# Patient Record
Sex: FEMALE | Race: WHITE | NOT HISPANIC OR LATINO | Employment: OTHER | URBAN - METROPOLITAN AREA
[De-identification: names, ages, dates, MRNs, and addresses within clinical notes are randomized per-mention and may not be internally consistent; named-entity substitution may affect disease eponyms.]

---

## 2024-03-10 ENCOUNTER — APPOINTMENT (OUTPATIENT)
Dept: RADIOLOGY | Facility: HOSPITAL | Age: 59
End: 2024-03-10
Payer: MEDICARE

## 2024-03-10 ENCOUNTER — HOSPITAL ENCOUNTER (OUTPATIENT)
Facility: HOSPITAL | Age: 59
Setting detail: OBSERVATION
Discharge: HOME | End: 2024-03-12
Attending: STUDENT IN AN ORGANIZED HEALTH CARE EDUCATION/TRAINING PROGRAM | Admitting: STUDENT IN AN ORGANIZED HEALTH CARE EDUCATION/TRAINING PROGRAM
Payer: MEDICARE

## 2024-03-10 DIAGNOSIS — S82.851A CLOSED TRIMALLEOLAR FRACTURE OF RIGHT ANKLE, INITIAL ENCOUNTER: ICD-10-CM

## 2024-03-10 DIAGNOSIS — S82.891A CLOSED FRACTURE OF RIGHT ANKLE, INITIAL ENCOUNTER: Primary | ICD-10-CM

## 2024-03-10 DIAGNOSIS — M25.571 ACUTE RIGHT ANKLE PAIN: ICD-10-CM

## 2024-03-10 DIAGNOSIS — R11.0 NAUSEA: ICD-10-CM
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
## 2024-03-10 LAB
ALBUMIN SERPL BCP-MCNC: 4.1 G/DL (ref 3.4–5)
ALP SERPL-CCNC: 77 U/L (ref 33–110)
ALT SERPL W P-5'-P-CCNC: 14 U/L (ref 7–45)
ANION GAP SERPL CALC-SCNC: 12 MMOL/L (ref 10–20)
APTT PPP: 32 SECONDS (ref 27–38)
AST SERPL W P-5'-P-CCNC: 17 U/L (ref 9–39)
BASOPHILS # BLD AUTO: 0.04 X10*3/UL (ref 0–0.1)
BASOPHILS NFR BLD AUTO: 0.4 %
BILIRUB SERPL-MCNC: 0.3 MG/DL (ref 0–1.2)
BUN SERPL-MCNC: 15 MG/DL (ref 6–23)
CALCIUM SERPL-MCNC: 8.9 MG/DL (ref 8.6–10.3)
CHLORIDE SERPL-SCNC: 105 MMOL/L (ref 98–107)
CO2 SERPL-SCNC: 23 MMOL/L (ref 21–32)
CREAT SERPL-MCNC: 0.93 MG/DL (ref 0.5–1.05)
EGFRCR SERPLBLD CKD-EPI 2021: 72 ML/MIN/1.73M*2
EOSINOPHIL # BLD AUTO: 0.05 X10*3/UL (ref 0–0.7)
EOSINOPHIL NFR BLD AUTO: 0.5 %
ERYTHROCYTE [DISTWIDTH] IN BLOOD BY AUTOMATED COUNT: 13 % (ref 11.5–14.5)
GLUCOSE SERPL-MCNC: 108 MG/DL (ref 74–99)
HCT VFR BLD AUTO: 39.2 % (ref 36–46)
HGB BLD-MCNC: 13 G/DL (ref 12–16)
IMM GRANULOCYTES # BLD AUTO: 0.06 X10*3/UL (ref 0–0.7)
IMM GRANULOCYTES NFR BLD AUTO: 0.6 % (ref 0–0.9)
INR PPP: 1 (ref 0.9–1.1)
LYMPHOCYTES # BLD AUTO: 1.21 X10*3/UL (ref 1.2–4.8)
LYMPHOCYTES NFR BLD AUTO: 11.2 %
MCH RBC QN AUTO: 28.4 PG (ref 26–34)
MCHC RBC AUTO-ENTMCNC: 33.2 G/DL (ref 32–36)
MCV RBC AUTO: 86 FL (ref 80–100)
MONOCYTES # BLD AUTO: 0.57 X10*3/UL (ref 0.1–1)
MONOCYTES NFR BLD AUTO: 5.3 %
NEUTROPHILS # BLD AUTO: 8.92 X10*3/UL (ref 1.2–7.7)
NEUTROPHILS NFR BLD AUTO: 82 %
NRBC BLD-RTO: 0 /100 WBCS (ref 0–0)
PLATELET # BLD AUTO: 276 X10*3/UL (ref 150–450)
POTASSIUM SERPL-SCNC: 4.4 MMOL/L (ref 3.5–5.3)
PROT SERPL-MCNC: 6.7 G/DL (ref 6.4–8.2)
PROTHROMBIN TIME: 11.2 SECONDS (ref 9.8–12.8)
RBC # BLD AUTO: 4.58 X10*6/UL (ref 4–5.2)
SODIUM SERPL-SCNC: 136 MMOL/L (ref 136–145)
WBC # BLD AUTO: 10.9 X10*3/UL (ref 4.4–11.3)

## 2024-03-10 PROCEDURE — 2500000005 HC RX 250 GENERAL PHARMACY W/O HCPCS: Performed by: STUDENT IN AN ORGANIZED HEALTH CARE EDUCATION/TRAINING PROGRAM

## 2024-03-10 PROCEDURE — 2500000004 HC RX 250 GENERAL PHARMACY W/ HCPCS (ALT 636 FOR OP/ED): Performed by: STUDENT IN AN ORGANIZED HEALTH CARE EDUCATION/TRAINING PROGRAM

## 2024-03-10 PROCEDURE — 96376 TX/PRO/DX INJ SAME DRUG ADON: CPT

## 2024-03-10 PROCEDURE — 73630 X-RAY EXAM OF FOOT: CPT | Mod: RT

## 2024-03-10 PROCEDURE — 99285 EMERGENCY DEPT VISIT HI MDM: CPT | Mod: 25

## 2024-03-10 PROCEDURE — 29515 APPLICATION SHORT LEG SPLINT: CPT | Performed by: STUDENT IN AN ORGANIZED HEALTH CARE EDUCATION/TRAINING PROGRAM

## 2024-03-10 PROCEDURE — G0378 HOSPITAL OBSERVATION PER HR: HCPCS

## 2024-03-10 PROCEDURE — 96375 TX/PRO/DX INJ NEW DRUG ADDON: CPT

## 2024-03-10 PROCEDURE — 36415 COLL VENOUS BLD VENIPUNCTURE: CPT | Performed by: STUDENT IN AN ORGANIZED HEALTH CARE EDUCATION/TRAINING PROGRAM

## 2024-03-10 PROCEDURE — 80053 COMPREHEN METABOLIC PANEL: CPT | Performed by: STUDENT IN AN ORGANIZED HEALTH CARE EDUCATION/TRAINING PROGRAM

## 2024-03-10 PROCEDURE — 94681 O2 UPTK CO2 OUTP % O2 XTRC: CPT

## 2024-03-10 PROCEDURE — 85610 PROTHROMBIN TIME: CPT | Performed by: STUDENT IN AN ORGANIZED HEALTH CARE EDUCATION/TRAINING PROGRAM

## 2024-03-10 PROCEDURE — 85025 COMPLETE CBC W/AUTO DIFF WBC: CPT | Performed by: STUDENT IN AN ORGANIZED HEALTH CARE EDUCATION/TRAINING PROGRAM

## 2024-03-10 PROCEDURE — 73630 X-RAY EXAM OF FOOT: CPT | Mod: RIGHT SIDE | Performed by: RADIOLOGY

## 2024-03-10 PROCEDURE — 73610 X-RAY EXAM OF ANKLE: CPT | Mod: RT

## 2024-03-10 PROCEDURE — 73610 X-RAY EXAM OF ANKLE: CPT | Mod: RIGHT SIDE | Performed by: RADIOLOGY

## 2024-03-10 PROCEDURE — 2500000001 HC RX 250 WO HCPCS SELF ADMINISTERED DRUGS (ALT 637 FOR MEDICARE OP): Performed by: STUDENT IN AN ORGANIZED HEALTH CARE EDUCATION/TRAINING PROGRAM

## 2024-03-10 PROCEDURE — 73610 X-RAY EXAM OF ANKLE: CPT | Mod: 59,RT

## 2024-03-10 PROCEDURE — 96361 HYDRATE IV INFUSION ADD-ON: CPT

## 2024-03-10 PROCEDURE — 99222 1ST HOSP IP/OBS MODERATE 55: CPT | Performed by: STUDENT IN AN ORGANIZED HEALTH CARE EDUCATION/TRAINING PROGRAM

## 2024-03-10 RX ORDER — KETOROLAC TROMETHAMINE 30 MG/ML
15 INJECTION, SOLUTION INTRAMUSCULAR; INTRAVENOUS ONCE
Status: COMPLETED | OUTPATIENT
Start: 2024-03-10 | End: 2024-03-10

## 2024-03-10 RX ORDER — GUAIFENESIN 600 MG/1
600 TABLET, EXTENDED RELEASE ORAL EVERY 12 HOURS PRN
Status: DISCONTINUED | OUTPATIENT
Start: 2024-03-10 | End: 2024-03-12 | Stop reason: HOSPADM

## 2024-03-10 RX ORDER — ALPRAZOLAM 0.5 MG/1
0.5 TABLET ORAL 3 TIMES DAILY PRN
Status: DISCONTINUED | OUTPATIENT
Start: 2024-03-10 | End: 2024-03-12 | Stop reason: HOSPADM

## 2024-03-10 RX ORDER — SENNOSIDES 8.6 MG/1
2 TABLET ORAL 2 TIMES DAILY
Status: DISCONTINUED | OUTPATIENT
Start: 2024-03-10 | End: 2024-03-12 | Stop reason: HOSPADM

## 2024-03-10 RX ORDER — MORPHINE SULFATE 4 MG/ML
4 INJECTION, SOLUTION INTRAMUSCULAR; INTRAVENOUS EVERY 4 HOURS PRN
Status: DISCONTINUED | OUTPATIENT
Start: 2024-03-10 | End: 2024-03-12 | Stop reason: HOSPADM

## 2024-03-10 RX ORDER — PROPOFOL 10 MG/ML
INJECTION, EMULSION INTRAVENOUS CODE/TRAUMA/SEDATION MEDICATION
Status: COMPLETED | OUTPATIENT
Start: 2024-03-10 | End: 2024-03-10

## 2024-03-10 RX ORDER — ALBUTEROL SULFATE 90 UG/1
2 AEROSOL, METERED RESPIRATORY (INHALATION) EVERY 4 HOURS PRN
Status: DISCONTINUED | OUTPATIENT
Start: 2024-03-10 | End: 2024-03-12 | Stop reason: HOSPADM

## 2024-03-10 RX ORDER — PROPOFOL 10 MG/ML
0.5 INJECTION, EMULSION INTRAVENOUS AS NEEDED
Status: DISCONTINUED | OUTPATIENT
Start: 2024-03-10 | End: 2024-03-12 | Stop reason: HOSPADM

## 2024-03-10 RX ORDER — ACETAMINOPHEN 650 MG/1
650 SUPPOSITORY RECTAL EVERY 4 HOURS PRN
Status: DISCONTINUED | OUTPATIENT
Start: 2024-03-10 | End: 2024-03-12 | Stop reason: HOSPADM

## 2024-03-10 RX ORDER — PROPOFOL 10 MG/ML
100 INJECTION, EMULSION INTRAVENOUS ONCE
Status: DISCONTINUED | OUTPATIENT
Start: 2024-03-10 | End: 2024-03-12 | Stop reason: HOSPADM

## 2024-03-10 RX ORDER — ONDANSETRON HYDROCHLORIDE 2 MG/ML
4 INJECTION, SOLUTION INTRAVENOUS EVERY 8 HOURS PRN
Status: DISCONTINUED | OUTPATIENT
Start: 2024-03-10 | End: 2024-03-12 | Stop reason: HOSPADM

## 2024-03-10 RX ORDER — ACETAMINOPHEN 160 MG/5ML
650 SOLUTION ORAL EVERY 4 HOURS PRN
Status: DISCONTINUED | OUTPATIENT
Start: 2024-03-10 | End: 2024-03-12 | Stop reason: HOSPADM

## 2024-03-10 RX ORDER — FLUTICASONE FUROATE AND VILANTEROL 200; 25 UG/1; UG/1
1 POWDER RESPIRATORY (INHALATION)
Status: DISCONTINUED | OUTPATIENT
Start: 2024-03-10 | End: 2024-03-12 | Stop reason: HOSPADM

## 2024-03-10 RX ORDER — ALPRAZOLAM 0.5 MG/1
0.5 TABLET ORAL 3 TIMES DAILY PRN
COMMUNITY

## 2024-03-10 RX ORDER — FENTANYL CITRATE 50 UG/ML
50 INJECTION, SOLUTION INTRAMUSCULAR; INTRAVENOUS ONCE
Status: COMPLETED | OUTPATIENT
Start: 2024-03-10 | End: 2024-03-10

## 2024-03-10 RX ORDER — HEPARIN SODIUM 5000 [USP'U]/ML
7500 INJECTION, SOLUTION INTRAVENOUS; SUBCUTANEOUS EVERY 8 HOURS SCHEDULED
Status: DISCONTINUED | OUTPATIENT
Start: 2024-03-10 | End: 2024-03-12

## 2024-03-10 RX ORDER — ONDANSETRON HYDROCHLORIDE 2 MG/ML
4 INJECTION, SOLUTION INTRAVENOUS ONCE
Status: COMPLETED | OUTPATIENT
Start: 2024-03-10 | End: 2024-03-10

## 2024-03-10 RX ORDER — ATORVASTATIN CALCIUM 80 MG/1
80 TABLET, FILM COATED ORAL DAILY
COMMUNITY

## 2024-03-10 RX ORDER — METOPROLOL SUCCINATE 25 MG/1
25 TABLET, EXTENDED RELEASE ORAL DAILY
COMMUNITY

## 2024-03-10 RX ORDER — ACETAMINOPHEN 325 MG/1
650 TABLET ORAL EVERY 4 HOURS PRN
Status: DISCONTINUED | OUTPATIENT
Start: 2024-03-10 | End: 2024-03-12 | Stop reason: HOSPADM

## 2024-03-10 RX ORDER — SUMATRIPTAN 50 MG/1
50 TABLET, FILM COATED ORAL ONCE AS NEEDED
COMMUNITY

## 2024-03-10 RX ORDER — ONDANSETRON 4 MG/1
4 TABLET, ORALLY DISINTEGRATING ORAL EVERY 8 HOURS PRN
Status: DISCONTINUED | OUTPATIENT
Start: 2024-03-10 | End: 2024-03-12 | Stop reason: HOSPADM

## 2024-03-10 RX ORDER — OMEPRAZOLE 40 MG/1
40 CAPSULE, DELAYED RELEASE ORAL 2 TIMES DAILY
COMMUNITY

## 2024-03-10 RX ORDER — GUAIFENESIN/DEXTROMETHORPHAN 100-10MG/5
5 SYRUP ORAL EVERY 4 HOURS PRN
Status: DISCONTINUED | OUTPATIENT
Start: 2024-03-10 | End: 2024-03-12 | Stop reason: HOSPADM

## 2024-03-10 RX ORDER — ATORVASTATIN CALCIUM 80 MG/1
80 TABLET, FILM COATED ORAL DAILY
Status: DISCONTINUED | OUTPATIENT
Start: 2024-03-10 | End: 2024-03-12 | Stop reason: HOSPADM

## 2024-03-10 RX ORDER — KETOROLAC TROMETHAMINE 30 MG/ML
30 INJECTION, SOLUTION INTRAMUSCULAR; INTRAVENOUS EVERY 6 HOURS PRN
Status: DISCONTINUED | OUTPATIENT
Start: 2024-03-10 | End: 2024-03-12 | Stop reason: HOSPADM

## 2024-03-10 RX ORDER — METOPROLOL SUCCINATE 25 MG/1
25 TABLET, EXTENDED RELEASE ORAL DAILY
Status: DISCONTINUED | OUTPATIENT
Start: 2024-03-10 | End: 2024-03-12 | Stop reason: HOSPADM

## 2024-03-10 RX ORDER — HEPARIN SODIUM 5000 [USP'U]/ML
7500 INJECTION, SOLUTION INTRAVENOUS; SUBCUTANEOUS EVERY 8 HOURS SCHEDULED
Status: DISCONTINUED | OUTPATIENT
Start: 2024-03-10 | End: 2024-03-10

## 2024-03-10 RX ORDER — BUDESONIDE AND FORMOTEROL FUMARATE DIHYDRATE 160; 4.5 UG/1; UG/1
2 AEROSOL RESPIRATORY (INHALATION)
COMMUNITY

## 2024-03-10 RX ORDER — ALBUTEROL SULFATE 90 UG/1
2 AEROSOL, METERED RESPIRATORY (INHALATION) EVERY 4 HOURS PRN
COMMUNITY

## 2024-03-10 RX ORDER — SUMATRIPTAN SUCCINATE 25 MG/1
50 TABLET ORAL ONCE AS NEEDED
Status: DISCONTINUED | OUTPATIENT
Start: 2024-03-10 | End: 2024-03-12 | Stop reason: HOSPADM

## 2024-03-10 RX ADMIN — HEPARIN SODIUM 7500 UNITS: 5000 INJECTION INTRAVENOUS; SUBCUTANEOUS at 21:31

## 2024-03-10 RX ADMIN — SODIUM CHLORIDE 1000 ML: 9 INJECTION, SOLUTION INTRAVENOUS at 14:38

## 2024-03-10 RX ADMIN — PROPOFOL 50 MG: 10 INJECTION, EMULSION INTRAVENOUS at 14:43

## 2024-03-10 RX ADMIN — STANDARDIZED SENNA CONCENTRATE 17.2 MG: 8.6 TABLET ORAL at 21:32

## 2024-03-10 RX ADMIN — ONDANSETRON 4 MG: 2 INJECTION INTRAMUSCULAR; INTRAVENOUS at 19:52

## 2024-03-10 RX ADMIN — MORPHINE SULFATE 4 MG: 4 INJECTION, SOLUTION INTRAMUSCULAR; INTRAVENOUS at 23:05

## 2024-03-10 RX ADMIN — Medication 2 L/MIN: at 14:40

## 2024-03-10 RX ADMIN — ONDANSETRON 4 MG: 2 INJECTION INTRAMUSCULAR; INTRAVENOUS at 13:31

## 2024-03-10 RX ADMIN — Medication: at 15:31

## 2024-03-10 RX ADMIN — PROPOFOL 30 MG: 10 INJECTION, EMULSION INTRAVENOUS at 14:48

## 2024-03-10 RX ADMIN — MORPHINE SULFATE 4 MG: 4 INJECTION, SOLUTION INTRAMUSCULAR; INTRAVENOUS at 18:42

## 2024-03-10 RX ADMIN — FENTANYL CITRATE 50 MCG: 50 INJECTION, SOLUTION INTRAMUSCULAR; INTRAVENOUS at 13:31

## 2024-03-10 RX ADMIN — KETOROLAC TROMETHAMINE 30 MG: 30 INJECTION, SOLUTION INTRAMUSCULAR at 21:33

## 2024-03-10 RX ADMIN — PROPOFOL 20 MG: 10 INJECTION, EMULSION INTRAVENOUS at 14:46

## 2024-03-10 RX ADMIN — KETOROLAC TROMETHAMINE 15 MG: 30 INJECTION, SOLUTION INTRAMUSCULAR at 15:29

## 2024-03-10 RX ADMIN — ALPRAZOLAM 0.5 MG: 0.5 TABLET ORAL at 23:06

## 2024-03-10 SDOH — SOCIAL STABILITY: SOCIAL INSECURITY: HAVE YOU HAD THOUGHTS OF HARMING ANYONE ELSE?: NO

## 2024-03-10 SDOH — SOCIAL STABILITY: SOCIAL INSECURITY: HAS ANYONE EVER THREATENED TO HURT YOUR FAMILY OR YOUR PETS?: NO

## 2024-03-10 SDOH — SOCIAL STABILITY: SOCIAL INSECURITY: WERE YOU ABLE TO COMPLETE ALL THE BEHAVIORAL HEALTH SCREENINGS?: YES

## 2024-03-10 SDOH — SOCIAL STABILITY: SOCIAL INSECURITY: ARE YOU OR HAVE YOU BEEN THREATENED OR ABUSED PHYSICALLY, EMOTIONALLY, OR SEXUALLY BY ANYONE?: NO

## 2024-03-10 SDOH — SOCIAL STABILITY: SOCIAL INSECURITY: DO YOU FEEL ANYONE HAS EXPLOITED OR TAKEN ADVANTAGE OF YOU FINANCIALLY OR OF YOUR PERSONAL PROPERTY?: NO

## 2024-03-10 SDOH — SOCIAL STABILITY: SOCIAL INSECURITY: DO YOU FEEL UNSAFE GOING BACK TO THE PLACE WHERE YOU ARE LIVING?: NO

## 2024-03-10 SDOH — SOCIAL STABILITY: SOCIAL INSECURITY: ABUSE: ADULT

## 2024-03-10 SDOH — SOCIAL STABILITY: SOCIAL INSECURITY: DOES ANYONE TRY TO KEEP YOU FROM HAVING/CONTACTING OTHER FRIENDS OR DOING THINGS OUTSIDE YOUR HOME?: NO

## 2024-03-10 SDOH — SOCIAL STABILITY: SOCIAL INSECURITY: ARE THERE ANY APPARENT SIGNS OF INJURIES/BEHAVIORS THAT COULD BE RELATED TO ABUSE/NEGLECT?: NO

## 2024-03-10 ASSESSMENT — LIFESTYLE VARIABLES
HAVE PEOPLE ANNOYED YOU BY CRITICIZING YOUR DRINKING: NO
HOW OFTEN DO YOU HAVE A DRINK CONTAINING ALCOHOL: NEVER
EVER HAD A DRINK FIRST THING IN THE MORNING TO STEADY YOUR NERVES TO GET RID OF A HANGOVER: NO
AUDIT-C TOTAL SCORE: 0
HOW MANY STANDARD DRINKS CONTAINING ALCOHOL DO YOU HAVE ON A TYPICAL DAY: PATIENT DOES NOT DRINK
HAVE YOU EVER FELT YOU SHOULD CUT DOWN ON YOUR DRINKING: NO
HOW OFTEN DO YOU HAVE 6 OR MORE DRINKS ON ONE OCCASION: NEVER
AUDIT-C TOTAL SCORE: 0
EVER FELT BAD OR GUILTY ABOUT YOUR DRINKING: NO
SKIP TO QUESTIONS 9-10: 1

## 2024-03-10 ASSESSMENT — ACTIVITIES OF DAILY LIVING (ADL)
ADEQUATE_TO_COMPLETE_ADL: YES
WALKS IN HOME: INDEPENDENT
TOILETING: INDEPENDENT
JUDGMENT_ADEQUATE_SAFELY_COMPLETE_DAILY_ACTIVITIES: YES
BATHING: INDEPENDENT
LACK_OF_TRANSPORTATION: NO
DRESSING YOURSELF: INDEPENDENT
PATIENT'S MEMORY ADEQUATE TO SAFELY COMPLETE DAILY ACTIVITIES?: YES
HEARING - LEFT EAR: FUNCTIONAL
FEEDING YOURSELF: INDEPENDENT
ASSISTIVE_DEVICE: CANE;EYEGLASSES;DENTURES UPPER
HEARING - RIGHT EAR: FUNCTIONAL
GROOMING: INDEPENDENT

## 2024-03-10 ASSESSMENT — COGNITIVE AND FUNCTIONAL STATUS - GENERAL
STANDING UP FROM CHAIR USING ARMS: A LOT
TURNING FROM BACK TO SIDE WHILE IN FLAT BAD: A LITTLE
STANDING UP FROM CHAIR USING ARMS: A LOT
CLIMB 3 TO 5 STEPS WITH RAILING: A LOT
MOVING FROM LYING ON BACK TO SITTING ON SIDE OF FLAT BED WITH BEDRAILS: A LITTLE
WALKING IN HOSPITAL ROOM: A LOT
HELP NEEDED FOR BATHING: A LITTLE
DRESSING REGULAR LOWER BODY CLOTHING: A LITTLE
CLIMB 3 TO 5 STEPS WITH RAILING: A LOT
HELP NEEDED FOR BATHING: A LITTLE
DRESSING REGULAR LOWER BODY CLOTHING: A LITTLE
DAILY ACTIVITIY SCORE: 21
MOVING TO AND FROM BED TO CHAIR: A LOT
WALKING IN HOSPITAL ROOM: A LOT
TURNING FROM BACK TO SIDE WHILE IN FLAT BAD: A LITTLE
MOVING TO AND FROM BED TO CHAIR: A LOT
PATIENT BASELINE BEDBOUND: NO
MOBILITY SCORE: 14
TOILETING: A LITTLE
MOVING FROM LYING ON BACK TO SITTING ON SIDE OF FLAT BED WITH BEDRAILS: A LITTLE
MOBILITY SCORE: 14

## 2024-03-10 ASSESSMENT — PAIN - FUNCTIONAL ASSESSMENT
PAIN_FUNCTIONAL_ASSESSMENT: 0-10

## 2024-03-10 ASSESSMENT — PAIN SCALES - GENERAL
PAINLEVEL_OUTOF10: 5 - MODERATE PAIN
PAINLEVEL_OUTOF10: 7
PAINLEVEL_OUTOF10: 4
PAINLEVEL_OUTOF10: 6
PAINLEVEL_OUTOF10: 7

## 2024-03-10 ASSESSMENT — PATIENT HEALTH QUESTIONNAIRE - PHQ9
2. FEELING DOWN, DEPRESSED OR HOPELESS: NOT AT ALL
1. LITTLE INTEREST OR PLEASURE IN DOING THINGS: NOT AT ALL
SUM OF ALL RESPONSES TO PHQ9 QUESTIONS 1 & 2: 0

## 2024-03-10 ASSESSMENT — PAIN DESCRIPTION - LOCATION
LOCATION: LEG
LOCATION: ANKLE

## 2024-03-10 ASSESSMENT — PAIN DESCRIPTION - ORIENTATION
ORIENTATION: RIGHT
ORIENTATION: RIGHT

## 2024-03-10 ASSESSMENT — COLUMBIA-SUICIDE SEVERITY RATING SCALE - C-SSRS
2. HAVE YOU ACTUALLY HAD ANY THOUGHTS OF KILLING YOURSELF?: NO
1. IN THE PAST MONTH, HAVE YOU WISHED YOU WERE DEAD OR WISHED YOU COULD GO TO SLEEP AND NOT WAKE UP?: NO
6. HAVE YOU EVER DONE ANYTHING, STARTED TO DO ANYTHING, OR PREPARED TO DO ANYTHING TO END YOUR LIFE?: NO

## 2024-03-10 NOTE — ED PROVIDER NOTES
HPI   Chief Complaint   Patient presents with   • Fall       Patient a 58-year-old female presents to the emergency department complaints of right ankle pain.  Patient states she was walking her dog when the dog pulled her and she ended up slipping and falling.  Patient was able to get herself into the house after the initial incident.  She denies hitting her head or losing consciousness.  Patient denies any blood thinners.  Patient Endorses pain only to her right ankle denies any other pain or complaints.      History provided by:  Patient   used: No                        No data recorded                   Patient History   No past medical history on file.  No past surgical history on file.  No family history on file.  Social History     Tobacco Use   • Smoking status: Not on file   • Smokeless tobacco: Not on file   Substance Use Topics   • Alcohol use: Not on file   • Drug use: Not on file       Physical Exam   ED Triage Vitals   Temp Pulse Resp BP   -- -- -- --      SpO2 Temp src Heart Rate Source Patient Position   -- -- -- --      BP Location FiO2 (%)     -- --       Physical Exam  Vitals and nursing note reviewed.   Constitutional:       General: She is not in acute distress.     Appearance: Normal appearance. She is not ill-appearing, toxic-appearing or diaphoretic.   HENT:      Head: Normocephalic and atraumatic.      Nose: Nose normal.      Mouth/Throat:      Mouth: Mucous membranes are moist.      Pharynx: No oropharyngeal exudate or posterior oropharyngeal erythema.   Eyes:      General: No scleral icterus.     Extraocular Movements: Extraocular movements intact.      Pupils: Pupils are equal, round, and reactive to light.   Cardiovascular:      Rate and Rhythm: Normal rate and regular rhythm.      Pulses: Normal pulses.      Heart sounds: Normal heart sounds. No murmur heard.     No friction rub. No gallop.   Pulmonary:      Effort: Pulmonary effort is normal. No respiratory  distress.      Breath sounds: Normal breath sounds. No stridor. No wheezing, rhonchi or rales.   Chest:      Chest wall: No tenderness.   Abdominal:      General: Abdomen is flat. There is no distension.      Palpations: Abdomen is soft. There is no mass.      Tenderness: There is no abdominal tenderness. There is no guarding.      Hernia: No hernia is present.   Musculoskeletal:         General: Swelling, tenderness and signs of injury present.      Cervical back: Normal range of motion and neck supple. No rigidity.   Skin:     General: Skin is warm and dry.      Capillary Refill: Capillary refill takes less than 2 seconds.      Coloration: Skin is not jaundiced or pale.      Findings: No bruising, erythema, lesion or rash.   Neurological:      General: No focal deficit present.      Mental Status: She is alert and oriented to person, place, and time. Mental status is at baseline.   Psychiatric:         Mood and Affect: Mood normal.         Behavior: Behavior normal.         ED Course & MDM   Diagnoses as of 03/10/24 1546   Fall, initial encounter   Acute right ankle pain   Closed fracture of right ankle, initial encounter   Closed trimalleolar fracture of right ankle, initial encounter       Medical Decision Making  Patient a 58-year-old female presents emergency department complaints of right ankle pain.  There is notable swelling and tenderness to palpation throughout the right ankle.  Patient does have normal TP and DP pulses.  Sensation normal.  Patient does not have any pain or tenderness over the base of the fifth metatarsal or the head of the fibula.  Patient was given 100 mics of fentanyl via EMS as well as 4 mg of Zofran and placed in a vacuum splint.  X-rays were ordered.    X-ray showed trimalar fracture.  Spoke with on-call orthopedic surgeon Dr. Salmeron who recommended reduction and splinting for comfort and if the patient is able to tolerate the pain discharged home to follow-up in the office but  if the patient is unable to tolerate the pain or care for self at home would recommend admission to medicine with orthopedics on consult.  Patient was sedated with reduction and improved alignment on post x-rays.  MSPs remained intact.  Patient had a short leg posterior ankle stirrup splint applied by me with MSPs present before and after application.  Patient tolerated the procedure well.  Discussion with the patient about going home she states that she lives at home alone and does not have anyone that can help care for her and she would not be able to care for self at home and thus admission was recommended.  Call placed to hospitalist service. Dr. Sr accepted patient for admission.    Amount and/or Complexity of Data Reviewed  Labs: ordered.  Radiology: ordered. Decision-making details documented in ED Course.      Labs Reviewed   CBC WITH AUTO DIFFERENTIAL   COMPREHENSIVE METABOLIC PANEL   COAGULATION SCREEN     XR ankle right 3+ views   Final Result   TRIMALLEOLAR FRACTURE SUBLUXATION OF THE ANKLE AS ABOVE.   ___    Signed by Sharath Mccoy MD      XR foot right 3+ views   Final Result   TRIMALLEOLAR FRACTURE SUBLUXATION OF THE ANKLE AS ABOVE.   ___    Signed by Sharath Mccoy MD      XR ankle right 3+ views    (Results Pending)         Procedure  Moderate Sedation    Performed by: Wellington Caceres DO  Authorized by: Wellington Caceres DO    Consent:     Consent obtained:  Written    Consent given by:  Patient    Risks, benefits, and alternatives were discussed: yes      Risks discussed:  Allergic reaction, inadequate sedation, respiratory compromise necessitating ventilatory assistance and intubation, nausea and vomiting  Universal protocol:     Patient identity confirmed:  Verbally with patient and arm band  Indications:     Procedure performed:  Fracture reduction    Procedure necessitating sedation performed by:  Physician performing sedation    Intended level of sedation:  Moderate  Pre-sedation  assessment:     Time since last food or drink:  3.5 hrs    NPO status caution: urgency dictates proceeding with non-ideal NPO status      ASA classification: class 2 - patient with mild systemic disease      Pre-sedation assessments completed and reviewed: airway patency, anesthesia/sedation history, cardiovascular function, hydration status, mental status, nausea/vomiting, pain level, respiratory function and temperature      History of difficult intubation: no    Immediate pre-procedure details:     Reassessment: Patient reassessed immediately prior to procedure      Reviewed: vital signs      Verified: bag valve mask available, emergency equipment available, intubation equipment available, IV patency confirmed, oxygen available and suction available    Procedure details (see MAR for exact dosages):     Sedation start time:  3/10/2024 2:43 PM    Preoxygenation:  Nasal cannula    Sedation:  Propofol    Analgesia:  Fentanyl    Intra-procedure monitoring:  Blood pressure monitoring, continuous capnometry, frequent LOC assessments, frequent vital sign checks, continuous pulse oximetry and cardiac monitor    Intra-procedure events: none      Sedation end time:  3/10/2024 2:52 PM    Total sedation time (minutes):  9  Post-procedure details:     Procedure completion:  Tolerated  Orthopaedic Injury Treatment - Lower Extremity    Performed by: Wellington Caceres DO  Authorized by: Wellington Caceres DO    Consent:     Consent obtained:  Verbal    Consent given by:  Patient    Risks, benefits, and alternatives were discussed: yes      Risks discussed:  Fracture, nerve damage, restricted joint movement, vascular damage and irreducible dislocation    Alternatives discussed:  No treatment and delayed treatment  Universal protocol:     Patient identity confirmed:  Verbally with patient and arm band  Location:     Location:  Ankle    Ankle injury location:  R ankle    Ankle dislocation type: lateral    Pre-procedure details:      Distal perfusion: normal      Range of motion: reduced    Sedation:     Sedation type:  Moderate sedation  Procedure details:     Manipulation performed: yes      Ankle reduction method:  Direct traction    Reduction successful: yes      Reduction confirmed with imaging: yes      Immobilization:  Splint    Splint type:  Ankle stirrup (Short leg with ankle stirrup)    Supplies used:  Elastic bandage, cotton padding and fiberglass  Post-procedure details:     Neurological function: normal      Distal perfusion: normal      Range of motion: unchanged      Procedure completion:  Tolerated       Wellington Caceres DO  03/10/24 1542

## 2024-03-10 NOTE — H&P
Medical Group History and Physical  ASSESSMENT & PLAN:       #Trimalleolar fracture  - patient in significant pain.  Will continue to manage   With appropriate pain regimen  - will keep ankle immobilized with no weightbearing on the leg until cleared by orthopedics.    - orthopedics already consulted in the emergency room.  Will await for any additional recommendations.  -  Will obtain basic lab on patient  with n.p.o. at midnight for possible surgery tomorrow     #asthma  #Generalized anxiety disorder  #Obesity  #Asthma  # Right cervical radiculopathy  - continue home medications as allowed      VTE Prophylaxis:  heparin   diet: Cardiac n.p.o. at midnight  CODE STATUS: Full code        ---Of note, this documentation is completed using the Dragon Dictation system (voice recognition software). There may be spelling and/or grammatical errors that were not corrected prior to final submission.---    Herminia Sr MD    HISTORY OF PRESENT ILLNESS:   Chief Complaint:   Ankle pain status post fall    History Of Present Illness:    Gita Kincaid is a 58 y.o. female with a significant past medical history of  asthma, hyperlipidemia, generalized anxiety disorder, obesity and right cervical radiculopathy who is coming in after  mechanical fall with ankle pain and swelling.  Patient reports that she was walking her dog when the dog pulled her and she twisted her ankle.  Since then she has been having significant pain and is unable to  bear weight on the limb.  She denies any loss of sensation.  no loss of consciousness with fall and denies hitting her head.  Denies any recent fevers chills nausea or vomiting.  Does have intermittent shortness of breath due to asthma.     Patient vitals are all within normal limits in the emergency room.  x-rays of the right foot and ankle showed trimalleolar fracture subluxation of the ankle with a roughly transverse fracture through the distal fibular metaphysis with mild lateral  displacement and more proximal fracture fragment abuts the lateral talar dome.   Patient was treated for pain and ankle reduced.  Patient was discussed with orthopedics who will would like patient to be admitted for possible fixation.       Review of systems: 10 point review of systems is otherwise negative except as mentioned above.    PAST HISTORIES:     Past Medical History:  She has a past medical history of Anxiety, Asthma, COPD (chronic obstructive pulmonary disease) (CMS/HCC), and Hyperlipidemia.    Past Surgical History:  She has no past surgical history on file.      Social History:  She has no history on file for tobacco use, alcohol use, and drug use.    Family History:  No family history on file.     Allergies:  Hydromorphone    OBJECTIVE:     Last Recorded Vitals:  Vitals:    03/10/24 1453 03/10/24 1459 03/10/24 1500 03/10/24 1505   BP: 134/78 120/70 148/71 148/67   BP Location:       Patient Position:   Lying Lying   Pulse: 76 75 68 72   Resp:  17 13 15   Temp:       TempSrc:       SpO2: 97% 99% 98% 99%   Weight:       Height:         Last I/O:  No intake/output data recorded.    Physical Exam  HENT:      Head: Normocephalic.      Mouth/Throat:      Mouth: Mucous membranes are moist.   Eyes:      Pupils: Pupils are equal, round, and reactive to light.   Cardiovascular:      Rate and Rhythm: Normal rate and regular rhythm.      Pulses: Normal pulses.      Heart sounds: Normal heart sounds.   Pulmonary:      Effort: Pulmonary effort is normal.      Breath sounds: Normal breath sounds.   Abdominal:      General: Bowel sounds are normal.   Musculoskeletal:         General: Swelling and tenderness present.      Comments:  right lower extremity swollen and wrapped to just proximal to the knee.   Skin:     General: Skin is warm.      Capillary Refill: Capillary refill takes less than 2 seconds.   Neurological:      General: No focal deficit present.      Mental Status: She is alert and oriented to person,  "place, and time.   Psychiatric:         Mood and Affect: Mood normal.         Scheduled Medications  ketorolac, 15 mg, intravenous, Once  propofol, 100 mg, intravenous, Once  sodium chloride, 1,000 mL, intravenous, Once      PRN Medications  PRN medications: propofol **FOLLOWED BY** propofol  Continuous Medications  oxygen, , Last Rate: 2 L/min (03/10/24 1440)        Outpatient Medications:  Prior to Admission medications    Not on File       LABS AND IMAGING:     Labs:            No lab exists for component: \"GFRAA\", \"LABGLOM\", \"LABALBU\"            Imaging:  XR foot right 3+ views  Narrative: STUDY:  Right foot and ankle radiographs; 3/10/2024 at 1308 hours.  INDICATION:  Pain, fall.  COMPARISON:  None Available.  ACCESSION NUMBER(S):  [id number], [id number]  ORDERING CLINICIAN:  JUAN SARAVIA  TECHNIQUE:  Three views of the right foot and three views of the right  ankle.  FINDINGS:   Bone mineralization is maintained.  There is a trial malleoli are fracture subluxation of the ankle.  There is a roughly transverse fracture through the distal fibular  metaphysis.  The major distal fracture fragment shows mild lateral  displacement.  The more proximal fracture fragment abuts the lateral  talar dome.  There is a transverse fracture through the base of the medial  malleolus which is displaced slightly medially.  There is a coronal fractures of the posterior malleolus which is  displaced several millimeters posteriorly.  There is 7 mm of medial subluxation of the distal tibia relative to  the talus.  The right foot itself shows no evidence of acute fracture or  dislocation.  Impression: TRIMALLEOLAR FRACTURE SUBLUXATION OF THE ANKLE AS ABOVE.  ___   Signed by Sharath Mccoy MD  XR ankle right 3+ views  Narrative: STUDY:  Right foot and ankle radiographs; 3/10/2024 at 1308 hours.  INDICATION:  Pain, fall.  COMPARISON:  None Available.  ACCESSION NUMBER(S):  [id number], [id number]  ORDERING CLINICIAN:  JUAN" JENNIFER SARAVIA  TECHNIQUE:  Three views of the right foot and three views of the right  ankle.  FINDINGS:   Bone mineralization is maintained.  There is a trial malleoli are fracture subluxation of the ankle.  There is a roughly transverse fracture through the distal fibular  metaphysis.  The major distal fracture fragment shows mild lateral  displacement.  The more proximal fracture fragment abuts the lateral  talar dome.  There is a transverse fracture through the base of the medial  malleolus which is displaced slightly medially.  There is a coronal fractures of the posterior malleolus which is  displaced several millimeters posteriorly.  There is 7 mm of medial subluxation of the distal tibia relative to  the talus.  The right foot itself shows no evidence of acute fracture or  dislocation.  Impression: TRIMALLEOLAR FRACTURE SUBLUXATION OF THE ANKLE AS ABOVE.  ___   Signed by Sharath Mccoy MD

## 2024-03-11 ENCOUNTER — ANESTHESIA (OUTPATIENT)
Dept: OPERATING ROOM | Facility: HOSPITAL | Age: 59
End: 2024-03-11
Payer: MEDICARE

## 2024-03-11 ENCOUNTER — HOSPITAL ENCOUNTER (OUTPATIENT)
Dept: CARDIOLOGY | Facility: HOSPITAL | Age: 59
Setting detail: OBSERVATION
Discharge: HOME | End: 2024-03-11
Payer: MEDICARE

## 2024-03-11 ENCOUNTER — APPOINTMENT (OUTPATIENT)
Dept: RADIOLOGY | Facility: HOSPITAL | Age: 59
End: 2024-03-11
Payer: MEDICARE

## 2024-03-11 ENCOUNTER — ANESTHESIA EVENT (OUTPATIENT)
Dept: OPERATING ROOM | Facility: HOSPITAL | Age: 59
End: 2024-03-11
Payer: MEDICARE

## 2024-03-11 PROBLEM — F41.9 ANXIETY: Status: ACTIVE | Noted: 2024-03-11

## 2024-03-11 PROBLEM — M54.12 CERVICAL RADICULOPATHY: Status: ACTIVE | Noted: 2024-03-11

## 2024-03-11 PROBLEM — J45.909 ASTHMA (HHS-HCC): Status: ACTIVE | Noted: 2024-03-11

## 2024-03-11 PROBLEM — E66.01 CLASS 3 SEVERE OBESITY IN ADULT (MULTI): Status: ACTIVE | Noted: 2024-03-11

## 2024-03-11 PROBLEM — Z87.891 FORMER SMOKER: Status: ACTIVE | Noted: 2024-03-11

## 2024-03-11 PROBLEM — E78.5 HYPERLIPIDEMIA: Status: ACTIVE | Noted: 2024-03-11

## 2024-03-11 PROBLEM — S82.891A CLOSED FRACTURE OF RIGHT ANKLE: Status: ACTIVE | Noted: 2024-03-10

## 2024-03-11 LAB
ALBUMIN SERPL BCP-MCNC: 4 G/DL (ref 3.4–5)
ALP SERPL-CCNC: 73 U/L (ref 33–110)
ALT SERPL W P-5'-P-CCNC: 15 U/L (ref 7–45)
ANION GAP SERPL CALC-SCNC: 10 MMOL/L (ref 10–20)
AST SERPL W P-5'-P-CCNC: 17 U/L (ref 9–39)
BILIRUB SERPL-MCNC: 0.4 MG/DL (ref 0–1.2)
BUN SERPL-MCNC: 19 MG/DL (ref 6–23)
CALCIUM SERPL-MCNC: 8.9 MG/DL (ref 8.6–10.3)
CHLORIDE SERPL-SCNC: 105 MMOL/L (ref 98–107)
CO2 SERPL-SCNC: 27 MMOL/L (ref 21–32)
CREAT SERPL-MCNC: 1.01 MG/DL (ref 0.5–1.05)
EGFRCR SERPLBLD CKD-EPI 2021: 65 ML/MIN/1.73M*2
ERYTHROCYTE [DISTWIDTH] IN BLOOD BY AUTOMATED COUNT: 13.6 % (ref 11.5–14.5)
GLUCOSE SERPL-MCNC: 110 MG/DL (ref 74–99)
HCT VFR BLD AUTO: 38 % (ref 36–46)
HGB BLD-MCNC: 12.4 G/DL (ref 12–16)
HOLD SPECIMEN: NORMAL
MCH RBC QN AUTO: 28.2 PG (ref 26–34)
MCHC RBC AUTO-ENTMCNC: 32.6 G/DL (ref 32–36)
MCV RBC AUTO: 87 FL (ref 80–100)
NRBC BLD-RTO: 0 /100 WBCS (ref 0–0)
PLATELET # BLD AUTO: 251 X10*3/UL (ref 150–450)
POTASSIUM SERPL-SCNC: 4 MMOL/L (ref 3.5–5.3)
PROT SERPL-MCNC: 6.7 G/DL (ref 6.4–8.2)
RBC # BLD AUTO: 4.39 X10*6/UL (ref 4–5.2)
SODIUM SERPL-SCNC: 138 MMOL/L (ref 136–145)
WBC # BLD AUTO: 5.4 X10*3/UL (ref 4.4–11.3)

## 2024-03-11 PROCEDURE — C1713 ANCHOR/SCREW BN/BN,TIS/BN: HCPCS | Performed by: ORTHOPAEDIC SURGERY

## 2024-03-11 PROCEDURE — 2500000004 HC RX 250 GENERAL PHARMACY W/ HCPCS (ALT 636 FOR OP/ED): Performed by: STUDENT IN AN ORGANIZED HEALTH CARE EDUCATION/TRAINING PROGRAM

## 2024-03-11 PROCEDURE — 85027 COMPLETE CBC AUTOMATED: CPT | Performed by: STUDENT IN AN ORGANIZED HEALTH CARE EDUCATION/TRAINING PROGRAM

## 2024-03-11 PROCEDURE — 27822 TREATMENT OF ANKLE FRACTURE: CPT | Performed by: PHYSICIAN ASSISTANT

## 2024-03-11 PROCEDURE — 99223 1ST HOSP IP/OBS HIGH 75: CPT | Performed by: ORTHOPAEDIC SURGERY

## 2024-03-11 PROCEDURE — 2500000004 HC RX 250 GENERAL PHARMACY W/ HCPCS (ALT 636 FOR OP/ED): Performed by: NURSE PRACTITIONER

## 2024-03-11 PROCEDURE — 2780000003 HC OR 278 NO HCPCS: Performed by: ORTHOPAEDIC SURGERY

## 2024-03-11 PROCEDURE — 93010 ELECTROCARDIOGRAM REPORT: CPT | Performed by: INTERNAL MEDICINE

## 2024-03-11 PROCEDURE — 2500000005 HC RX 250 GENERAL PHARMACY W/O HCPCS: Performed by: STUDENT IN AN ORGANIZED HEALTH CARE EDUCATION/TRAINING PROGRAM

## 2024-03-11 PROCEDURE — 99232 SBSQ HOSP IP/OBS MODERATE 35: CPT | Performed by: HOSPITALIST

## 2024-03-11 PROCEDURE — 3700000001 HC GENERAL ANESTHESIA TIME - INITIAL BASE CHARGE: Performed by: ORTHOPAEDIC SURGERY

## 2024-03-11 PROCEDURE — C1769 GUIDE WIRE: HCPCS | Performed by: ORTHOPAEDIC SURGERY

## 2024-03-11 PROCEDURE — 2500000002 HC RX 250 W HCPCS SELF ADMINISTERED DRUGS (ALT 637 FOR MEDICARE OP, ALT 636 FOR OP/ED): Performed by: STUDENT IN AN ORGANIZED HEALTH CARE EDUCATION/TRAINING PROGRAM

## 2024-03-11 PROCEDURE — 2500000001 HC RX 250 WO HCPCS SELF ADMINISTERED DRUGS (ALT 637 FOR MEDICARE OP): Performed by: NURSE PRACTITIONER

## 2024-03-11 PROCEDURE — 80053 COMPREHEN METABOLIC PANEL: CPT | Performed by: STUDENT IN AN ORGANIZED HEALTH CARE EDUCATION/TRAINING PROGRAM

## 2024-03-11 PROCEDURE — 2500000004 HC RX 250 GENERAL PHARMACY W/ HCPCS (ALT 636 FOR OP/ED): Performed by: ORTHOPAEDIC SURGERY

## 2024-03-11 PROCEDURE — A4217 STERILE WATER/SALINE, 500 ML: HCPCS | Performed by: ORTHOPAEDIC SURGERY

## 2024-03-11 PROCEDURE — 3600000009 HC OR TIME - EACH INCREMENTAL 1 MINUTE - PROCEDURE LEVEL FOUR: Performed by: ORTHOPAEDIC SURGERY

## 2024-03-11 PROCEDURE — 93005 ELECTROCARDIOGRAM TRACING: CPT | Mod: 59

## 2024-03-11 PROCEDURE — 3700000002 HC GENERAL ANESTHESIA TIME - EACH INCREMENTAL 1 MINUTE: Performed by: ORTHOPAEDIC SURGERY

## 2024-03-11 PROCEDURE — G0378 HOSPITAL OBSERVATION PER HR: HCPCS

## 2024-03-11 PROCEDURE — 7100000002 HC RECOVERY ROOM TIME - EACH INCREMENTAL 1 MINUTE: Performed by: ORTHOPAEDIC SURGERY

## 2024-03-11 PROCEDURE — 2500000001 HC RX 250 WO HCPCS SELF ADMINISTERED DRUGS (ALT 637 FOR MEDICARE OP): Performed by: ORTHOPAEDIC SURGERY

## 2024-03-11 PROCEDURE — 96376 TX/PRO/DX INJ SAME DRUG ADON: CPT | Mod: 59

## 2024-03-11 PROCEDURE — 27822 TREATMENT OF ANKLE FRACTURE: CPT | Performed by: ORTHOPAEDIC SURGERY

## 2024-03-11 PROCEDURE — 94640 AIRWAY INHALATION TREATMENT: CPT

## 2024-03-11 PROCEDURE — 36415 COLL VENOUS BLD VENIPUNCTURE: CPT | Performed by: STUDENT IN AN ORGANIZED HEALTH CARE EDUCATION/TRAINING PROGRAM

## 2024-03-11 PROCEDURE — 2500000001 HC RX 250 WO HCPCS SELF ADMINISTERED DRUGS (ALT 637 FOR MEDICARE OP): Performed by: STUDENT IN AN ORGANIZED HEALTH CARE EDUCATION/TRAINING PROGRAM

## 2024-03-11 PROCEDURE — 7100000001 HC RECOVERY ROOM TIME - INITIAL BASE CHARGE: Performed by: ORTHOPAEDIC SURGERY

## 2024-03-11 PROCEDURE — 2720000007 HC OR 272 NO HCPCS: Performed by: ORTHOPAEDIC SURGERY

## 2024-03-11 PROCEDURE — 96365 THER/PROPH/DIAG IV INF INIT: CPT | Mod: 59

## 2024-03-11 PROCEDURE — 3600000004 HC OR TIME - INITIAL BASE CHARGE - PROCEDURE LEVEL FOUR: Performed by: ORTHOPAEDIC SURGERY

## 2024-03-11 DEVICE — SCREW, LOCK 2.7 X 18 VA ST T8 STARDRIVE RECESS: Type: IMPLANTABLE DEVICE | Site: ANKLE | Status: FUNCTIONAL

## 2024-03-11 DEVICE — SCREW, LOCK VA 2.7 X 16 ST T8 SDRV: Type: IMPLANTABLE DEVICE | Site: ANKLE | Status: FUNCTIONAL

## 2024-03-11 DEVICE — IMPLANTABLE DEVICE: Type: IMPLANTABLE DEVICE | Site: ANKLE | Status: FUNCTIONAL

## 2024-03-11 DEVICE — SCREW, CORTICAL, SELF-TAPPING, 3.5 X 14 MM, STAINLESS STEEL: Type: IMPLANTABLE DEVICE | Site: ANKLE | Status: FUNCTIONAL

## 2024-03-11 DEVICE — SCREW, CORTEX SELF TAP W/T8 RECESS 2.7MM X 14MM, SS: Type: IMPLANTABLE DEVICE | Site: ANKLE | Status: FUNCTIONAL

## 2024-03-11 DEVICE — SCREW, LOCK 2.7 X 14 VA ST T8 STARDRIVE RECESS: Type: IMPLANTABLE DEVICE | Site: ANKLE | Status: FUNCTIONAL

## 2024-03-11 RX ORDER — LABETALOL HYDROCHLORIDE 5 MG/ML
5 INJECTION, SOLUTION INTRAVENOUS ONCE AS NEEDED
Status: DISCONTINUED | OUTPATIENT
Start: 2024-03-11 | End: 2024-03-11 | Stop reason: HOSPADM

## 2024-03-11 RX ORDER — FENTANYL CITRATE 50 UG/ML
INJECTION, SOLUTION INTRAMUSCULAR; INTRAVENOUS AS NEEDED
Status: DISCONTINUED | OUTPATIENT
Start: 2024-03-11 | End: 2024-03-11

## 2024-03-11 RX ORDER — SODIUM CHLORIDE 0.9 G/100ML
IRRIGANT IRRIGATION AS NEEDED
Status: DISCONTINUED | OUTPATIENT
Start: 2024-03-11 | End: 2024-03-11 | Stop reason: HOSPADM

## 2024-03-11 RX ORDER — NALOXONE HYDROCHLORIDE 1 MG/ML
0.2 INJECTION INTRAMUSCULAR; INTRAVENOUS; SUBCUTANEOUS EVERY 5 MIN PRN
Status: DISCONTINUED | OUTPATIENT
Start: 2024-03-11 | End: 2024-03-11 | Stop reason: SDUPTHER

## 2024-03-11 RX ORDER — OXYCODONE HCL 5 MG/5 ML
10 SOLUTION, ORAL ORAL EVERY 4 HOURS PRN
Status: DISCONTINUED | OUTPATIENT
Start: 2024-03-11 | End: 2024-03-12 | Stop reason: HOSPADM

## 2024-03-11 RX ORDER — MORPHINE SULFATE 2 MG/ML
2 INJECTION, SOLUTION INTRAMUSCULAR; INTRAVENOUS EVERY 2 HOUR PRN
Status: DISCONTINUED | OUTPATIENT
Start: 2024-03-11 | End: 2024-03-12 | Stop reason: HOSPADM

## 2024-03-11 RX ORDER — OXYCODONE HYDROCHLORIDE 5 MG/1
5 TABLET ORAL EVERY 4 HOURS PRN
Status: DISCONTINUED | OUTPATIENT
Start: 2024-03-11 | End: 2024-03-12 | Stop reason: HOSPADM

## 2024-03-11 RX ORDER — ROCURONIUM BROMIDE 10 MG/ML
INJECTION, SOLUTION INTRAVENOUS AS NEEDED
Status: DISCONTINUED | OUTPATIENT
Start: 2024-03-11 | End: 2024-03-11

## 2024-03-11 RX ORDER — DEXAMETHASONE SODIUM PHOSPHATE 4 MG/ML
INJECTION, SOLUTION INTRA-ARTICULAR; INTRALESIONAL; INTRAMUSCULAR; INTRAVENOUS; SOFT TISSUE AS NEEDED
Status: DISCONTINUED | OUTPATIENT
Start: 2024-03-11 | End: 2024-03-11

## 2024-03-11 RX ORDER — LIDOCAINE HYDROCHLORIDE 20 MG/ML
INJECTION, SOLUTION INFILTRATION; PERINEURAL AS NEEDED
Status: DISCONTINUED | OUTPATIENT
Start: 2024-03-11 | End: 2024-03-11

## 2024-03-11 RX ORDER — SODIUM CHLORIDE, SODIUM LACTATE, POTASSIUM CHLORIDE, CALCIUM CHLORIDE 600; 310; 30; 20 MG/100ML; MG/100ML; MG/100ML; MG/100ML
100 INJECTION, SOLUTION INTRAVENOUS CONTINUOUS
Status: DISCONTINUED | OUTPATIENT
Start: 2024-03-11 | End: 2024-03-11 | Stop reason: HOSPADM

## 2024-03-11 RX ORDER — NALOXONE HYDROCHLORIDE 1 MG/ML
0.2 INJECTION INTRAMUSCULAR; INTRAVENOUS; SUBCUTANEOUS EVERY 5 MIN PRN
Status: DISCONTINUED | OUTPATIENT
Start: 2024-03-11 | End: 2024-03-12 | Stop reason: HOSPADM

## 2024-03-11 RX ORDER — ROPIVACAINE HYDROCHLORIDE 5 MG/ML
15 INJECTION, SOLUTION EPIDURAL; INFILTRATION; PERINEURAL ONCE
Status: COMPLETED | OUTPATIENT
Start: 2024-03-11 | End: 2024-03-11

## 2024-03-11 RX ORDER — FENTANYL CITRATE 50 UG/ML
50 INJECTION, SOLUTION INTRAMUSCULAR; INTRAVENOUS EVERY 5 MIN PRN
Status: DISCONTINUED | OUTPATIENT
Start: 2024-03-11 | End: 2024-03-11 | Stop reason: HOSPADM

## 2024-03-11 RX ORDER — PROPOFOL 10 MG/ML
INJECTION, EMULSION INTRAVENOUS AS NEEDED
Status: DISCONTINUED | OUTPATIENT
Start: 2024-03-11 | End: 2024-03-11

## 2024-03-11 RX ORDER — ONDANSETRON HYDROCHLORIDE 2 MG/ML
INJECTION, SOLUTION INTRAVENOUS AS NEEDED
Status: DISCONTINUED | OUTPATIENT
Start: 2024-03-11 | End: 2024-03-11

## 2024-03-11 RX ORDER — SODIUM CHLORIDE, SODIUM LACTATE, POTASSIUM CHLORIDE, CALCIUM CHLORIDE 600; 310; 30; 20 MG/100ML; MG/100ML; MG/100ML; MG/100ML
50 INJECTION, SOLUTION INTRAVENOUS CONTINUOUS
Status: DISCONTINUED | OUTPATIENT
Start: 2024-03-11 | End: 2024-03-12 | Stop reason: HOSPADM

## 2024-03-11 RX ORDER — FENTANYL CITRATE 50 UG/ML
25 INJECTION, SOLUTION INTRAMUSCULAR; INTRAVENOUS EVERY 5 MIN PRN
Status: DISCONTINUED | OUTPATIENT
Start: 2024-03-11 | End: 2024-03-11 | Stop reason: HOSPADM

## 2024-03-11 RX ORDER — CEFAZOLIN SODIUM 2 G/100ML
2 INJECTION, SOLUTION INTRAVENOUS EVERY 8 HOURS
Status: COMPLETED | OUTPATIENT
Start: 2024-03-11 | End: 2024-03-12

## 2024-03-11 RX ORDER — ACETAMINOPHEN 325 MG/1
650 TABLET ORAL EVERY 6 HOURS SCHEDULED
Status: DISCONTINUED | OUTPATIENT
Start: 2024-03-11 | End: 2024-03-12 | Stop reason: HOSPADM

## 2024-03-11 RX ORDER — CEFAZOLIN SODIUM 2 G/100ML
2 INJECTION, SOLUTION INTRAVENOUS ONCE
Status: COMPLETED | OUTPATIENT
Start: 2024-03-11 | End: 2024-03-11

## 2024-03-11 RX ORDER — MIDAZOLAM HYDROCHLORIDE 1 MG/ML
INJECTION, SOLUTION INTRAMUSCULAR; INTRAVENOUS AS NEEDED
Status: DISCONTINUED | OUTPATIENT
Start: 2024-03-11 | End: 2024-03-11

## 2024-03-11 RX ORDER — DIAZEPAM 5 MG/ML
5 INJECTION, SOLUTION INTRAMUSCULAR; INTRAVENOUS EVERY 6 HOURS PRN
Status: DISCONTINUED | OUTPATIENT
Start: 2024-03-11 | End: 2024-03-12 | Stop reason: HOSPADM

## 2024-03-11 RX ORDER — CYCLOBENZAPRINE HCL 10 MG
5 TABLET ORAL 3 TIMES DAILY
Status: DISCONTINUED | OUTPATIENT
Start: 2024-03-11 | End: 2024-03-12 | Stop reason: HOSPADM

## 2024-03-11 RX ORDER — BACITRACIN 500 [USP'U]/G
OINTMENT TOPICAL AS NEEDED
Status: DISCONTINUED | OUTPATIENT
Start: 2024-03-11 | End: 2024-03-11 | Stop reason: HOSPADM

## 2024-03-11 RX ORDER — ONDANSETRON HYDROCHLORIDE 2 MG/ML
4 INJECTION, SOLUTION INTRAVENOUS ONCE AS NEEDED
Status: DISCONTINUED | OUTPATIENT
Start: 2024-03-11 | End: 2024-03-11 | Stop reason: HOSPADM

## 2024-03-11 RX ORDER — DOCUSATE SODIUM 100 MG/1
100 CAPSULE, LIQUID FILLED ORAL 2 TIMES DAILY
Status: DISCONTINUED | OUTPATIENT
Start: 2024-03-11 | End: 2024-03-12 | Stop reason: HOSPADM

## 2024-03-11 RX ADMIN — SUGAMMADEX 200 MG: 100 INJECTION, SOLUTION INTRAVENOUS at 14:38

## 2024-03-11 RX ADMIN — ACETAMINOPHEN 650 MG: 325 TABLET ORAL at 17:09

## 2024-03-11 RX ADMIN — ROPIVACAINE HYDROCHLORIDE 75 MG: 5 INJECTION EPIDURAL; INFILTRATION; PERINEURAL at 13:38

## 2024-03-11 RX ADMIN — CYCLOBENZAPRINE HYDROCHLORIDE 5 MG: 10 TABLET, FILM COATED ORAL at 17:09

## 2024-03-11 RX ADMIN — HYDROMORPHONE HYDROCHLORIDE 0.5 MG: 1 INJECTION, SOLUTION INTRAMUSCULAR; INTRAVENOUS; SUBCUTANEOUS at 15:10

## 2024-03-11 RX ADMIN — Medication 3 L/MIN: at 15:05

## 2024-03-11 RX ADMIN — ALPRAZOLAM 0.5 MG: 0.5 TABLET ORAL at 22:36

## 2024-03-11 RX ADMIN — ROCURONIUM BROMIDE 50 MG: 10 SOLUTION INTRAVENOUS at 13:45

## 2024-03-11 RX ADMIN — FENTANYL CITRATE 50 MCG: 50 INJECTION, SOLUTION INTRAMUSCULAR; INTRAVENOUS at 14:32

## 2024-03-11 RX ADMIN — FENTANYL CITRATE 50 MCG: 50 INJECTION, SOLUTION INTRAMUSCULAR; INTRAVENOUS at 14:05

## 2024-03-11 RX ADMIN — ONDANSETRON 4 MG: 2 INJECTION, SOLUTION INTRAMUSCULAR; INTRAVENOUS at 14:24

## 2024-03-11 RX ADMIN — LIDOCAINE HYDROCHLORIDE 60 MG: 20 INJECTION, SOLUTION INFILTRATION; PERINEURAL at 13:45

## 2024-03-11 RX ADMIN — PROPOFOL 200 MG: 10 INJECTION, EMULSION INTRAVENOUS at 13:45

## 2024-03-11 RX ADMIN — FENTANYL CITRATE 50 MCG: 50 INJECTION, SOLUTION INTRAMUSCULAR; INTRAVENOUS at 13:45

## 2024-03-11 RX ADMIN — ROPIVACAINE HYDROCHLORIDE: 5 INJECTION EPIDURAL; INFILTRATION; PERINEURAL at 13:38

## 2024-03-11 RX ADMIN — FLUTICASONE FUROATE AND VILANTEROL TRIFENATATE 1 PUFF: 200; 25 POWDER RESPIRATORY (INHALATION) at 08:09

## 2024-03-11 RX ADMIN — FENTANYL CITRATE 50 MCG: 50 INJECTION, SOLUTION INTRAMUSCULAR; INTRAVENOUS at 14:55

## 2024-03-11 RX ADMIN — DEXAMETHASONE SODIUM PHOSPHATE 8 MG: 4 INJECTION, SOLUTION INTRAMUSCULAR; INTRAVENOUS at 14:02

## 2024-03-11 RX ADMIN — HYDROMORPHONE HYDROCHLORIDE 0.5 MG: 1 INJECTION, SOLUTION INTRAMUSCULAR; INTRAVENOUS; SUBCUTANEOUS at 15:06

## 2024-03-11 RX ADMIN — SODIUM CHLORIDE, POTASSIUM CHLORIDE, SODIUM LACTATE AND CALCIUM CHLORIDE 50 ML/HR: 600; 310; 30; 20 INJECTION, SOLUTION INTRAVENOUS at 08:09

## 2024-03-11 RX ADMIN — MORPHINE SULFATE 4 MG: 4 INJECTION, SOLUTION INTRAMUSCULAR; INTRAVENOUS at 04:39

## 2024-03-11 RX ADMIN — CEFAZOLIN SODIUM 2 G: 2 INJECTION, SOLUTION INTRAVENOUS at 21:42

## 2024-03-11 RX ADMIN — PROMETHAZINE HYDROCHLORIDE 6.25 MG: 25 INJECTION INTRAMUSCULAR; INTRAVENOUS at 15:06

## 2024-03-11 RX ADMIN — MIDAZOLAM 2 MG: 1 INJECTION INTRAMUSCULAR; INTRAVENOUS at 13:17

## 2024-03-11 RX ADMIN — METOPROLOL SUCCINATE 25 MG: 25 TABLET, EXTENDED RELEASE ORAL at 08:09

## 2024-03-11 RX ADMIN — CEFAZOLIN SODIUM 2 G: 2 INJECTION, SOLUTION INTRAVENOUS at 13:54

## 2024-03-11 RX ADMIN — ALPRAZOLAM 0.5 MG: 0.5 TABLET ORAL at 08:25

## 2024-03-11 RX ADMIN — FENTANYL CITRATE 50 MCG: 50 INJECTION, SOLUTION INTRAMUSCULAR; INTRAVENOUS at 15:02

## 2024-03-11 RX ADMIN — KETOROLAC TROMETHAMINE 30 MG: 30 INJECTION, SOLUTION INTRAMUSCULAR at 08:25

## 2024-03-11 RX ADMIN — ONDANSETRON 4 MG: 2 INJECTION INTRAMUSCULAR; INTRAVENOUS at 04:39

## 2024-03-11 RX ADMIN — CYCLOBENZAPRINE HYDROCHLORIDE 5 MG: 10 TABLET, FILM COATED ORAL at 21:41

## 2024-03-11 RX ADMIN — FENTANYL CITRATE 50 MCG: 50 INJECTION, SOLUTION INTRAMUSCULAR; INTRAVENOUS at 14:49

## 2024-03-11 SDOH — HEALTH STABILITY: MENTAL HEALTH: CURRENT SMOKER: 0

## 2024-03-11 ASSESSMENT — PAIN SCALES - GENERAL
PAINLEVEL_OUTOF10: 6
PAINLEVEL_OUTOF10: 3
PAINLEVEL_OUTOF10: 8
PAINLEVEL_OUTOF10: 6
PAINLEVEL_OUTOF10: 4
PAINLEVEL_OUTOF10: 0 - NO PAIN

## 2024-03-11 ASSESSMENT — ENCOUNTER SYMPTOMS
EYES NEGATIVE: 1
SHORTNESS OF BREATH: 0
ACTIVITY CHANGE: 0
ARTHRALGIAS: 1
CONSTITUTIONAL NEGATIVE: 1
FEVER: 0
GASTROINTESTINAL NEGATIVE: 1
PSYCHIATRIC NEGATIVE: 1
CHEST TIGHTNESS: 0
HEMATOLOGIC/LYMPHATIC NEGATIVE: 1
ALLERGIC/IMMUNOLOGIC NEGATIVE: 1
CARDIOVASCULAR NEGATIVE: 1
ENDOCRINE NEGATIVE: 1

## 2024-03-11 ASSESSMENT — PAIN - FUNCTIONAL ASSESSMENT
PAIN_FUNCTIONAL_ASSESSMENT: 0-10

## 2024-03-11 ASSESSMENT — PAIN DESCRIPTION - ORIENTATION: ORIENTATION: RIGHT

## 2024-03-11 ASSESSMENT — PAIN DESCRIPTION - LOCATION: LOCATION: ANKLE

## 2024-03-11 NOTE — ANESTHESIA PROCEDURE NOTES
Peripheral IV  Date/Time: 3/11/2024 1:54 PM      Placement  Needle size: 20 G  Laterality: left  Location: hand

## 2024-03-11 NOTE — ANESTHESIA PROCEDURE NOTES
Airway  Date/Time: 3/11/2024 1:48 PM  Urgency: elective    Airway not difficult    Staffing  Performed: attending   Authorized by: Juventino Fay MD    Performed by: Juventino Fay MD  Patient location during procedure: OR    Indications and Patient Condition  Indications for airway management: anesthesia  Spontaneous Ventilation: absent  Sedation level: deep  Preoxygenated: yes  Patient position: sniffing  Mask difficulty assessment: 1 - vent by mask  Planned trial extubation    Final Airway Details  Final airway type: endotracheal airway      Successful airway: ETT  Cuffed: yes   Successful intubation technique: video laryngoscopy  Endotracheal tube insertion site: oral  Blade type: Posadas 4.  Blade size: #4  ETT size (mm): 7.0  Cormack-Lehane Classification: grade I - full view of glottis  Placement verified by: capnometry   Measured from: lips  ETT to lips (cm): 21  Number of attempts at approach: 1

## 2024-03-11 NOTE — ANESTHESIA PROCEDURE NOTES
Peripheral Block    Patient location during procedure: holding area  Start time: 3/11/2024 1:17 PM  End time: 3/11/2024 1:28 PM  Reason for block: at surgeon's request and post-op pain management  Staffing  Performed: attending   Authorized by: Juventino Fay MD    Performed by: Juventino Fay MD  Preanesthetic Checklist  Completed: patient identified, IV checked, site marked, risks and benefits discussed, surgical consent, monitors and equipment checked, pre-op evaluation and timeout performed   Timeout performed at: 3/11/2024 1:17 PM  Peripheral Block  Patient position: laying flat  Prep: ChloraPrep  Patient monitoring: heart rate, cardiac monitor and continuous pulse ox  Block type: popliteal  Laterality: right  Injection technique: single-shot  Guidance: ultrasound guided  Infiltration strength: 2 %  Dose: 3 mL  Needle  Needle gauge: 21 G  Needle length: 10 cm  Needle localization: ultrasound guidance  Assessment  Injection assessment: negative aspiration for heme, no paresthesia on injection, incremental injection and local visualized surrounding nerve on ultrasound  Paresthesia pain: none  Heart rate change: no  Slow fractionated injection: yes  Additional Notes  Following timeout, sedation administered for patient comfort. Skin prep with chlorhexadine, local infiltration with 2% lidocaine. US for visualization of structures, real-time visualization of needle entry, visualization of local anesthetic spread. Aspiration negative for heme. 20cc 0.5% ropivacaine with 4mg dexamethasone injected in divided doses. Patient awake and conversant throughout, tolerated well with no apparent complications.

## 2024-03-11 NOTE — PROGRESS NOTES
03/11/24 1046   Discharge Planning   Living Arrangements Alone   Support Systems Children;Family members   Assistance Needed none, PTA Ind in ADLS and IADLS no AD, on disability-doesn't work, hasn't driven in 4 years- family or provide a ride through [employer] insurance takes to appts. Fall PTA resulting in ankle fx, denies other falls. Pt states has a walker- thinks it is at her son's home but unsure   Type of Residence Private residence  (1 level mobile home, 4 steps in with bilateral handrails)   Number of Stairs to Enter Residence 4  (Bilateral handrails)   Number of Stairs Within Residence 0   Type of Animals or Pets 1 dog - 35 lb (son taking care of)   Home or Post Acute Services In home services   Patient expects to be discharged to: Home vs C   Does the patient need discharge transport arranged? No   Financial Resource Strain   How hard is it for you to pay for the very basics like food, housing, medical care, and heating? Not hard   Housing Stability   In the last 12 months, was there a time when you were not able to pay the mortgage or rent on time? N   In the last 12 months, how many places have you lived? 1     Pt presented to ED after fall with ankle pain. Pt fell when walking dog, dog pulled her and she slipped in mud and fell, was unable to get up d/t pain. Pt has right trimalleolar fracture requiring operative repair. Pt is scheduled to go to OR today 3/11/24 for ORIF right trimalleolar fracture. Pt states DC preference is home, [facility] and [facility] team will monitor case for progression and follow up post op for PT/OT evals to aide in DC planning.

## 2024-03-11 NOTE — CONSULTS
Consults  Requesting Physician: Dr. Sr  Complaint: Right ankle fracture         Consult Note  Patient: Gita Kincaid  Unit/Bed: 608/608-A  YOB: 1965  MRN: [id number]  Acct: [id number]   Admitting Diagnosis: Closed trimalleolar fracture of right ankle, initial encounter [S82.851A]  Closed fracture of right ankle, initial encounter [S82.891A]  Fall, initial encounter [W19.XXXA]  Trimalleolar fracture of ankle, closed, right, initial encounter [S82.851A]  Acute right ankle pain [M25.571]  Date:  3/10/2024  Hospital Day: 0    Complaint:  Right ankle pain s/p fall with fracture     History of Present Illness:  Gita Kincaid is a 58 year old female patient per chart review with history of anxiety, asthma, COPD, hyperlipidemia who was admitted to [facility] with complaints of right ankle pain s/p fall PTA. Patient reports that she was walking her dog when the dog pulled her causing her to slip in mud and fall. She was able to get up and get into her house after fall. She denies hitting her head or LOC. She did not have any chest pain, lightheadedness or dizziness pre and post fall. She denies any chest pain, shortness of breath, N/V/D, abdominal pain or urinary symptoms.     PMHx:  Past Medical History:   Diagnosis Date    Anxiety     Asthma     COPD (chronic obstructive pulmonary disease) (CMS/[facility])     Hyperlipidemia        PSHx:  No past surgical history on file.    Social Hx:  Social History     Socioeconomic History    Marital status:      Spouse name: Not on file    Number of children: Not on file    Years of education: Not on file    Highest education level: Not on file   Occupational History    Not on file   Tobacco Use    Smoking status: Never    Smokeless tobacco: Never   Substance and Sexual Activity    Alcohol use: Not on file    Drug use: Not on file    Sexual activity: Not on file   Other Topics Concern    Not on file   Social History Narrative    Not on file      Social Determinants of Health     Financial Resource Strain: Low Risk  (3/10/2024)    Overall Financial Resource Strain (CARDIA)     Difficulty of Paying Living Expenses: Not hard at all   Food Insecurity: Not on file   Transportation Needs: No Transportation Needs (3/10/2024)    PRAPARE - Transportation     Lack of Transportation (Medical): No     Lack of Transportation (Non-Medical): No   Physical Activity: Not on file   Stress: Not on file   Social Connections: Not on file   Intimate Partner Violence: Not on file   Housing Stability: Low Risk  (3/10/2024)    Housing Stability Vital Sign     Unable to Pay for Housing in the Last Year: No     Number of Places Lived in the Last Year: 1     Unstable Housing in the Last Year: No       Family Hx:  No family history on file.    Review of Systems:   Review of Systems   Constitutional: Negative.  Negative for activity change and fever.   HENT: Negative.     Eyes: Negative.    Respiratory:  Negative for chest tightness and shortness of breath.    Cardiovascular: Negative.  Negative for chest pain.   Gastrointestinal: Negative.    Endocrine: Negative.    Genitourinary: Negative.    Musculoskeletal:  Positive for arthralgias and gait problem.   Skin: Negative.    Allergic/Immunologic: Negative.    Hematological: Negative.    Psychiatric/Behavioral: Negative.     All other systems reviewed and are negative.        Physical Examination:    Visit Vitals  /77 (BP Location: Right arm, Patient Position: Lying)   Pulse 81   Temp 37 °C (98.6 °F) (Temporal)   Resp 16      Physical Exam  Vitals and nursing note reviewed.   Constitutional:       General: She is not in acute distress.     Appearance: Normal appearance.   HENT:      Head: Normocephalic.      Nose: Nose normal.      Mouth/Throat:      Mouth: Mucous membranes are moist.   Eyes:      Extraocular Movements: Extraocular movements intact.      Pupils: Pupils are equal, round, and reactive to light.   Cardiovascular:       Rate and Rhythm: Normal rate and regular rhythm.      Pulses: Normal pulses.      Heart sounds: Normal heart sounds.   Pulmonary:      Effort: Pulmonary effort is normal.      Breath sounds: Normal breath sounds.   Abdominal:      General: Bowel sounds are normal.      Palpations: Abdomen is soft.   Musculoskeletal:         General: Swelling, tenderness, deformity and signs of injury present.      Cervical back: Normal range of motion. No rigidity or tenderness.   Skin:     General: Skin is warm.      Capillary Refill: Capillary refill takes less than 2 seconds.   Neurological:      General: No focal deficit present.      Mental Status: She is alert and oriented to person, place, and time.   Psychiatric:         Mood and Affect: Mood normal.         LABS:  CBC:   Lab Results   Component Value Date    WBC 10.9 03/10/2024    RBC 4.58 03/10/2024    HGB 13.0 03/10/2024    HCT 39.2 03/10/2024    MCV 86 03/10/2024    MCH 28.4 03/10/2024    MCHC 33.2 03/10/2024    RDW 13.0 03/10/2024     03/10/2024     CBC with Differential:    Lab Results   Component Value Date    WBC 10.9 03/10/2024    RBC 4.58 03/10/2024    HGB 13.0 03/10/2024    HCT 39.2 03/10/2024     03/10/2024    MCV 86 03/10/2024    MCH 28.4 03/10/2024    MCHC 33.2 03/10/2024    RDW 13.0 03/10/2024    NRBC 0.0 03/10/2024    LYMPHOPCT 11.2 03/10/2024    MONOPCT 5.3 03/10/2024    EOSPCT 0.5 03/10/2024    BASOPCT 0.4 03/10/2024    MONOSABS 0.57 03/10/2024    LYMPHSABS 1.21 03/10/2024    EOSABS 0.05 03/10/2024    BASOSABS 0.04 03/10/2024     CMP:    Lab Results   Component Value Date     03/10/2024    K 4.4 03/10/2024     03/10/2024    CO2 23 03/10/2024    BUN 15 03/10/2024    CREATININE 0.93 03/10/2024    GLUCOSE 108 (H) 03/10/2024    PROT 6.7 03/10/2024    CALCIUM 8.9 03/10/2024    BILITOT 0.3 03/10/2024    ALKPHOS 77 03/10/2024    AST 17 03/10/2024    ALT 14 03/10/2024     BMP:    Lab Results   Component Value Date     03/10/2024  "   K 4.4 03/10/2024     03/10/2024    CO2 23 03/10/2024    BUN 15 03/10/2024    CREATININE 0.93 03/10/2024    CALCIUM 8.9 03/10/2024    GLUCOSE 108 (H) 03/10/2024     Magnesium:No results found for: \"MG\"  Troponin:  No results found for: \"TROPONINI\"    Imaging   XR ankle right 3+ views    Result Date: 3/10/2024  STUDY: Ankle Radiographs;  3/10/2024 at 3:10 PM. INDICATION: Post reduction. COMPARISON: XR right foot/ankle 3/10/2024. ACCESSION NUMBER(S): [id number] ORDERING CLINICIAN: JUAN SARAVIA TECHNIQUE:  Three view(s) of the right ankle. FINDINGS:  Again demonstrated is a trimalleolar fracture of the right ankle There is a partly transverse partly oblique fracture of the distal fibula with abutment of the proximal fragment adjacent to the lateral talar dome. Transverse fracture of the medial malleolus is again seen. The coronal fracture of the posterior malleolus is partly obscured on the present study.  Imaging obtained with a splint/cast in place. The degree of subluxation appears significantly improved, with mild residual widening of the medial ankle mortise and slight narrowing of the lateral ankle mortise.  Soft tissue swelling persists.    Trimalleolar fracture of the right ankle post reduction and splinting. The degree of subluxation appears significantly improved. Signed by Michelle Meehan DO    XR ankle right 3+ views    Result Date: 3/10/2024  STUDY: Right foot and ankle radiographs; 3/10/2024 at 1308 hours. INDICATION: Pain, fall. COMPARISON: None Available. ACCESSION NUMBER(S): [id number], [id number] ORDERING CLINICIAN: JUAN SARAVIA TECHNIQUE:  Three views of the right foot and three views of the right ankle. FINDINGS: Bone mineralization is maintained. There is a trial malleoli are fracture subluxation of the ankle. There is a roughly transverse fracture through the distal fibular metaphysis.  The major distal fracture fragment shows mild lateral displacement.  The more proximal " fracture fragment abuts the lateral talar dome. There is a transverse fracture through the base of the medial malleolus which is displaced slightly medially. There is a coronal fractures of the posterior malleolus which is displaced several millimeters posteriorly. There is 7 mm of medial subluxation of the distal tibia relative to the talus. The right foot itself shows no evidence of acute fracture or dislocation.    TRIMALLEOLAR FRACTURE SUBLUXATION OF THE ANKLE AS ABOVE. ___ Signed by Sharath Mccoy MD    XR foot right 3+ views    Result Date: 3/10/2024  STUDY: Right foot and ankle radiographs; 3/10/2024 at 1308 hours. INDICATION: Pain, fall. COMPARISON: None Available. ACCESSION NUMBER(S): [id number], [id number] ORDERING CLINICIAN: JUAN SARAVIA TECHNIQUE:  Three views of the right foot and three views of the right ankle. FINDINGS: Bone mineralization is maintained. There is a trial malleoli are fracture subluxation of the ankle. There is a roughly transverse fracture through the distal fibular metaphysis.  The major distal fracture fragment shows mild lateral displacement.  The more proximal fracture fragment abuts the lateral talar dome. There is a transverse fracture through the base of the medial malleolus which is displaced slightly medially. There is a coronal fractures of the posterior malleolus which is displaced several millimeters posteriorly. There is 7 mm of medial subluxation of the distal tibia relative to the talus. The right foot itself shows no evidence of acute fracture or dislocation.    TRIMALLEOLAR FRACTURE SUBLUXATION OF THE ANKLE AS ABOVE. ___ Signed by Sharath Mccoy MD    BI mammo bilateral screening    Result Date: 2/29/2024  * * *Final Report* * * DATE OF EXAM: Feb 28 2024 10:49AM   M2W   0581  -  JENNI SCREENING  / ACCESSION #  [id number] PROCEDURE REASON: Encounter for screening mammogram for breast cancer      * * * * Physician Interpretation * * * * RESULT: #[id number] - JENNI  SCREENING BILATERAL DIGITAL SCREENING MAMMOGRAM WITH CAD: 2/28/2024 HISTORY: Encounter For Screening Mammogram For Breast Cancer /Screening Mammogram-Patient reports NO symptoms. RESULT: TECHNIQUE: The study was acquired using full field digital technology and interpreted from soft copy. Current study was also evaluated with a Computer Aided Detection (CAD). Comparison is made to exams dated: 10/27/2020 mammogram - [facility], 4/19/2019 mammogram, and 10/16/2018 mammogram - [facility].  The breasts are almost entirely fatty. No significant masses, calcifications, or other findings are seen in either breast. There has been no significant interval change.    IMPRESSION: NEGATIVE There is no mammographic evidence of malignancy. A 1 year screening mammogram is recommended.  The exam was reviewed by a staff physician. Allyssa Parra D.O., cp,sp/penrad:2/29/2024 08:24:45 Imaging Technologist(s): Jeanine Tee RT(R)(M), [facility] letter sent: Normal over 40 Mammogram BI-RADS: 1 Negative Multiple national specialty organizations have released breast cancer screening guidelines for women at average risk for developing breast cancer - guidelines that are based on both evidence and opinion, yet differ on when to start and how often to screen for breast cancer. With representation from Breast Imaging, Internal Medicine, Women's Health, Family Medicine, and Medical/Surgical Oncology, the [facility] has carefully reviewed the data and reached the following consensus: 1) All women should engage in shared decision-making with their providers to decide when to start and how often to screen; 2) All women should have the opportunity to start screening mammography at age 40; 3) For women ages 45-55, we recommend annual screening mammograms; 4) For women ages 55 and over, we support both the transition from an annual to a  biennial interval if this aligns more with patient's values and preferences, or continuation with annual screening; 5) All women should discuss with their providers when to stop screening mammograms. : Haley Antunezrimelissa Date/Time: Feb 28 2024 10:32A Dictated by: HORTENSIA MARTINEZ DO This examination was interpreted and the report reviewed and electronically signed by: ERICKA HARRISON MD on Feb 29 2024  8:24AM  EST        Assessment:       58 year old female patient admitted to hospital for right trimalleolar fracture requiring operative repair.     Images were reviewed independently and consistent with right trimalleolar fracture.   Upon examination she endorses pain about the right ankle. Extremity is NVI.         Plan:  NPO  Bedrest  Consent for open reduction internal fixation right trimalleolar fracture   Continue pain control             Electronically signed by NEVAEH Velazquez on 3/11/2024 at 7:04 AM    In a face-to-face encounter, I performed a history and physical examination, discussed pertinent diagnostic studies if indicated, and discussed diagnosis and management strategies with both the patient and the mid-level provider. I reviewed the mid levels note and agree with the documented findings and plan of care.  Greater than 50% of the evaluation and decision making process was performed by the physician.    Patient seen and examined.  Patient with severe impairment of function related to her ankle injury.  We discussed surgical and nonsurgical option.  Patient suffered a fall twisting her right ankle felt a pop complained of severe pain and inability to bear weight.  On examination she is in a posterior splint she is neurologically intact distally with brisk capillary refill no pain with passive range of motion.  Imaging shows displaced trimalleolar ankle fracture.    Impression is displaced trimalleolar ankle fracture    Plan is for open reduction internal fixation.  The procedures  risk benefits treatment alternatives and postoperative course were discussed with the patient she understands and wishes to proceed.    This note was prepared using voice recognition software.  The details of this note are correct and have been reviewed, and corrected to the best of my ability.  Some grammatical areas may persist related to the Dragon software    Matteo Lechuga MD  Senior Attending Physician  [facility]    [phone]

## 2024-03-11 NOTE — ANESTHESIA POSTPROCEDURE EVALUATION
Patient: Gita Kincaid    Procedure Summary       Date: 03/11/24 Room / Location: [facility] OR 11 / Virtual [facility] OR    Anesthesia Start: 1339 Anesthesia Stop: 1449    Procedure: Open Reduction Internal Fixation Ankle (Right: Ankle) Diagnosis:       Closed fracture of right ankle, initial encounter      (Closed fracture of right ankle, initial encounter [S82.891A])    Surgeons: Matteo Lechuga MD Responsible Provider: Juventino Fay MD    Anesthesia Type: general, regional ASA Status: 3            Anesthesia Type: general, regional    Vitals Value Taken Time   /65 03/11/24 1453   Temp 37.4 03/11/24 1453   Pulse 85 03/11/24 1453   Resp 10 03/11/24 1453   SpO2 100% 03/11/24 1453       Anesthesia Post Evaluation    Patient location during evaluation: PACU  Patient participation: complete - patient participated  Level of consciousness: awake and alert  Pain management: adequate  Multimodal analgesia pain management approach  Airway patency: patent  Cardiovascular status: acceptable  Respiratory status: acceptable  Hydration status: acceptable  Postoperative Nausea and Vomiting: none        No notable events documented.

## 2024-03-11 NOTE — OP NOTE
Open Reduction Internal Fixation Ankle (R) Operative Note     Date: 3/10/2024 - 3/11/2024  OR Location: [facility] OR    Name: Gita Kincaid, : 1965, Age: 58 y.o., MRN: [id number], Sex: female    Diagnosis  Pre-op Diagnosis     * Closed fracture of right ankle, initial encounter [S82.891A] Post-op Diagnosis     * Closed fracture of right ankle, initial encounter [S82.891A]     Procedures  Open Reduction Internal Fixation Ankle  30491 - ND OPEN TX TRIMALLEOLAR ANKLE FX W/FIXJ PST LIP      Surgeons      * Matteo Lechuga - Primary     * Catrachito Pineda    Resident/Fellow/Other Assistant:  Surgeon(s) and Role:     * Catrachito Pineda PA-C    Procedure Summary  Anesthesia: General  ASA: III  Anesthesia Staff: Anesthesiologist: Juventino Fay MD  Estimated Blood Loss: minmL  Intra-op Medications:   Administrations occurring from 1330 to 1430 on 24:   Medication Name Total Dose   sodium chloride 0.9 % irrigation solution 1,000 mL   bacitracin ointment 1 Application   acetaminophen (Tylenol) tablet 650 mg Cannot be calculated   albuterol 90 mcg/actuation inhaler 2 puff Cannot be calculated   ALPRAZolam (Xanax) tablet 0.5 mg Cannot be calculated   atorvastatin (Lipitor) tablet 80 mg Cannot be calculated   benzocaine-menthol (Cepastat Sore Throat) 15-3.6 mg lozenge 1 lozenge Cannot be calculated   dextromethorphan-guaifenesin (Robitussin DM)  mg/5 mL oral liquid 5 mL Cannot be calculated   diazePAM (Valium) injection 5 mg Cannot be calculated   docusate sodium (Colace) capsule 100 mg Cannot be calculated   flu vaccine (IIV4) age 6 months and greater, preservative free Cannot be calculated   fluticasone furoate-vilanteroL (Breo Ellipta) 200-25 mcg/dose inhaler 1 puff Cannot be calculated   guaiFENesin (Mucinex) 12 hr tablet 600 mg Cannot be calculated   ketorolac (Toradol) injection 30 mg Cannot be calculated   metoprolol succinate XL (Toprol-XL) 24 hr tablet 25 mg Cannot be calculated   morphine injection 2 mg  Cannot be calculated   morphine injection 4 mg Cannot be calculated   naloxone (Narcan) injection 0.2 mg Cannot be calculated   sennosides (Senokot) tablet 17.2 mg Cannot be calculated   SUMAtriptan (Imitrex) tablet 50 mg Cannot be calculated   ceFAZolin in dextrose (iso-os) (Ancef) IVPB 2 g 2 g   dexAMETHasone (PF) (Decadron) 5 mg, ropivacaine (Naropin) 100 mg injection Cannot be calculated   ropivacaine (Naropin) injection 75 mg 75 mg              Anesthesia Record               Intraprocedure I/O Totals       None           Specimen: No specimens collected     Staff:   Circulator: Mitchel Hernandez RN  Scrub Person: Lily Maloney         Drains and/or Catheters: * None in log *    Tourniquet Times:   * Missing tourniquet times found for documented tourniquets in lo *     Implants:     Findings: see procedure details    Indications: Gita Kincaid is an 58 y.o. female who is having surgery for Closed fracture of right ankle, initial encounter [S82.687U].     The patient was seen in the preoperative area. The risks, benefits, complications, treatment options, non-operative alternatives, expected recovery and outcomes were discussed with the patient. The possibilities of reaction to medication, pulmonary aspiration, injury to surrounding structures, bleeding, recurrent infection, the need for additional procedures, failure to diagnose a condition, and creating a complication requiring transfusion or operation were discussed with the patient. The patient concurred with the proposed plan, giving informed consent.  The site of surgery was properly noted/marked if necessary per policy. The patient has been actively warmed in preoperative area. Preoperative antibiotics have been ordered and given within 1 hours of incision. Venous thrombosis prophylaxis have been ordered.    Procedure Details:   Preoperative diagnosis trimalleolar ankle fracture right  Postoperative diagnosis same  Procedure open reduction  internal fixation right medial and lateral malleolus    Anesthesia General with nerve block    Primary surgeon Matteo Lechuga M.D.  Assisting Catrachito NEGRON certified    Blood loss minimal    The physician assistant was present through the entire case.  Given the nature of the disease process and the procedure to be performed skilled surgical first assistant was necessary during the case.  The assistant was necessary to hold retractors and manipulate the extremity during the procedure.  A certified scrub tech was at the back table managing the instruments and supplies for the surgical case.    Prior to taking the patient to the operating room the surgical site was marked H&P was reviewed updated in signed and patient received appropriate preoperative antibiotics      Patient with displaced trimalleolar ankle fracture recommend open reduction internal fixation the procedures risk benefits treatment alternatives and postoperative course were discussed with the patient she understood and wished to proceed.    Patient was placed on table supine position whereupon adequate general anesthesia was obtained.  Right lower extremity was prepped and draped in usual sterile manner.  Tourniquet was exsanguinated and inflated to 250 mmHg.  Surgical timeout was performed.  Linear incision was made along the lateral malleolus from the tip of the fibula extending approximately 10 cm proximal.  The incision was continued through the skin and subcutaneous tissues using sharp and blunt dissection.  Bovie electrocautery device was used for hemostasis.  Subperiosteal dissection was then performed to identify the fracture fragments.  Hematoma was irrigated from the fracture site.  Subperiosteal dissection was used to the exposed multiple fracture fragments which were then reduced with a tenaculum type reduction clamp.  A precontoured distal fibular plate was then secured with multiple distal and proximal screws without complication.   Fluoroscopic visualization showed anatomic alignment of the fibula fracture in the mortise.  Wound was irrigated with a copious amount of pulsatile irrigation and closed using 0 Vicryl for the fascia 3 oh mock subcutaneous tissue and staples for the skin.  The medial malleolus fragment was then addressed through a curvilinear incision through the skin and subcutaneous tissue we immediately encountered the fracture fragment there was extensive soft tissue stripping on the medial side.  Subperiosteal dissection was used to identify the fracture fragments and hematoma was irrigated from the ankle joint.  Fracture was then able to be reduced with a tenaculum reduction clamp and the guidewires for the 4.0 mm cannulated screws were then placed.  Fluoroscopic visualization showed anatomic alignment of the fracture the hardware to be in good position 46 mm long threaded malleolus screws were then placed across the fracture site with good compression.  Guidewires were removed.  Final fluoroscopy imaging showed anatomic alignment of ankle mortise the hardware to be in good position the fractures were well aligned.  Wounds were again irrigated with copious amount of saline irrigation closed using 0 Vicryl fascia 3-0 Monocryl subcutaneous tissue and staples for the skin.  Dry sterile well-padded bulky posterior splint was applied without complication.  Patient was extubated without difficulty taken to postanesthesia care unit in good condition without complication                    This note was prepared using voice recognition software.  The details of this note are correct and have been reviewed, and corrected to the best of my ability.  Some grammatical areas may persist related to the Dragon software    Matteo Lechuga MD    [phone]    Complications:  None; patient tolerated the procedure well.    Disposition: PACU - hemodynamically stable.  Condition: stable         Matteo Lechuga  Phone Number:  [phone]

## 2024-03-11 NOTE — ANESTHESIA PROCEDURE NOTES
Peripheral Block    Patient location during procedure: holding area  Start time: 3/11/2024 1:17 PM  End time: 3/11/2024 1:28 PM  Reason for block: at surgeon's request and post-op pain management  Staffing  Performed: attending   Authorized by: Juventino Fay MD    Performed by: Juventino Fay MD  Preanesthetic Checklist  Completed: patient identified, IV checked, site marked, risks and benefits discussed, surgical consent, monitors and equipment checked, pre-op evaluation and timeout performed   Timeout performed at: 3/11/2024 1:17 PM  Peripheral Block  Patient position: laying flat  Prep: ChloraPrep  Patient monitoring: heart rate, cardiac monitor and continuous pulse ox  Block type: adductor canal  Laterality: right  Injection technique: single-shot  Guidance: ultrasound guided  Infiltration strength: 2 %  Dose: 3 mL  Needle  Needle gauge: 21 G  Needle length: 10 cm  Needle localization: ultrasound guidance  Assessment  Injection assessment: negative aspiration for heme, no paresthesia on injection, incremental injection and local visualized surrounding nerve on ultrasound  Paresthesia pain: none  Heart rate change: no  Slow fractionated injection: yes  Additional Notes  Following timeout, sedation administered for patient comfort. Skin prep with chlorhexadine, local infiltration with 2% lidocaine. US for visualization of structures, real-time visualization of needle entry, visualization of local anesthetic spread. Aspiration negative for heme. 15cc 0.5% ropivacaine injected in divided doses. Patient awake and conversant throughout, tolerated well with no apparent complications.

## 2024-03-12 ENCOUNTER — PHARMACY VISIT (OUTPATIENT)
Dept: PHARMACY | Facility: CLINIC | Age: 59
End: 2024-03-12
Payer: COMMERCIAL

## 2024-03-12 ENCOUNTER — HOME HEALTH ADMISSION (OUTPATIENT)
Dept: HOME HEALTH SERVICES | Facility: HOME HEALTH | Age: 59
End: 2024-03-12
Payer: MEDICARE

## 2024-03-12 VITALS
RESPIRATION RATE: 16 BRPM | HEART RATE: 89 BPM | OXYGEN SATURATION: 94 % | SYSTOLIC BLOOD PRESSURE: 142 MMHG | HEIGHT: 60 IN | BODY MASS INDEX: 44.17 KG/M2 | WEIGHT: 225 LBS | TEMPERATURE: 97.5 F | DIASTOLIC BLOOD PRESSURE: 87 MMHG

## 2024-03-12 PROBLEM — R11.0 NAUSEA: Status: ACTIVE | Noted: 2024-03-12

## 2024-03-12 LAB
ANION GAP SERPL CALC-SCNC: 11 MMOL/L (ref 10–20)
BUN SERPL-MCNC: 17 MG/DL (ref 6–23)
CALCIUM SERPL-MCNC: 8.8 MG/DL (ref 8.6–10.3)
CHLORIDE SERPL-SCNC: 104 MMOL/L (ref 98–107)
CO2 SERPL-SCNC: 25 MMOL/L (ref 21–32)
CREAT SERPL-MCNC: 0.99 MG/DL (ref 0.5–1.05)
EGFRCR SERPLBLD CKD-EPI 2021: 67 ML/MIN/1.73M*2
ERYTHROCYTE [DISTWIDTH] IN BLOOD BY AUTOMATED COUNT: 13.2 % (ref 11.5–14.5)
GLUCOSE SERPL-MCNC: 100 MG/DL (ref 74–99)
HCT VFR BLD AUTO: 34.7 % (ref 36–46)
HGB BLD-MCNC: 11.6 G/DL (ref 12–16)
HOLD SPECIMEN: NORMAL
MCH RBC QN AUTO: 28.4 PG (ref 26–34)
MCHC RBC AUTO-ENTMCNC: 33.4 G/DL (ref 32–36)
MCV RBC AUTO: 85 FL (ref 80–100)
NRBC BLD-RTO: 0 /100 WBCS (ref 0–0)
PLATELET # BLD AUTO: 275 X10*3/UL (ref 150–450)
POTASSIUM SERPL-SCNC: 4 MMOL/L (ref 3.5–5.3)
RBC # BLD AUTO: 4.09 X10*6/UL (ref 4–5.2)
SODIUM SERPL-SCNC: 136 MMOL/L (ref 136–145)
WBC # BLD AUTO: 9.4 X10*3/UL (ref 4.4–11.3)

## 2024-03-12 PROCEDURE — 2500000004 HC RX 250 GENERAL PHARMACY W/ HCPCS (ALT 636 FOR OP/ED): Performed by: NURSE PRACTITIONER

## 2024-03-12 PROCEDURE — 2500000001 HC RX 250 WO HCPCS SELF ADMINISTERED DRUGS (ALT 637 FOR MEDICARE OP): Performed by: ORTHOPAEDIC SURGERY

## 2024-03-12 PROCEDURE — G0378 HOSPITAL OBSERVATION PER HR: HCPCS

## 2024-03-12 PROCEDURE — 97116 GAIT TRAINING THERAPY: CPT | Mod: GP,CQ

## 2024-03-12 PROCEDURE — RXMED WILLOW AMBULATORY MEDICATION CHARGE

## 2024-03-12 PROCEDURE — 99239 HOSP IP/OBS DSCHRG MGMT >30: CPT | Performed by: STUDENT IN AN ORGANIZED HEALTH CARE EDUCATION/TRAINING PROGRAM

## 2024-03-12 PROCEDURE — 97161 PT EVAL LOW COMPLEX 20 MIN: CPT | Mod: GP | Performed by: PHYSICAL THERAPIST

## 2024-03-12 PROCEDURE — 97530 THERAPEUTIC ACTIVITIES: CPT | Mod: GP,CQ

## 2024-03-12 PROCEDURE — 80048 BASIC METABOLIC PNL TOTAL CA: CPT | Performed by: HOSPITALIST

## 2024-03-12 PROCEDURE — 2500000001 HC RX 250 WO HCPCS SELF ADMINISTERED DRUGS (ALT 637 FOR MEDICARE OP): Performed by: NURSE PRACTITIONER

## 2024-03-12 PROCEDURE — 96376 TX/PRO/DX INJ SAME DRUG ADON: CPT

## 2024-03-12 PROCEDURE — 2500000004 HC RX 250 GENERAL PHARMACY W/ HCPCS (ALT 636 FOR OP/ED): Performed by: ORTHOPAEDIC SURGERY

## 2024-03-12 PROCEDURE — 36415 COLL VENOUS BLD VENIPUNCTURE: CPT | Performed by: HOSPITALIST

## 2024-03-12 PROCEDURE — 97165 OT EVAL LOW COMPLEX 30 MIN: CPT | Mod: GO

## 2024-03-12 PROCEDURE — 2500000001 HC RX 250 WO HCPCS SELF ADMINISTERED DRUGS (ALT 637 FOR MEDICARE OP): Performed by: STUDENT IN AN ORGANIZED HEALTH CARE EDUCATION/TRAINING PROGRAM

## 2024-03-12 PROCEDURE — 85027 COMPLETE CBC AUTOMATED: CPT | Performed by: HOSPITALIST

## 2024-03-12 RX ORDER — METOCLOPRAMIDE 10 MG/1
10 TABLET ORAL 4 TIMES DAILY PRN
Qty: 30 TABLET | Refills: 0 | Status: SHIPPED | OUTPATIENT
Start: 2024-03-12

## 2024-03-12 RX ORDER — PANTOPRAZOLE SODIUM 40 MG/1
40 TABLET, DELAYED RELEASE ORAL ONCE
Status: COMPLETED | OUTPATIENT
Start: 2024-03-12 | End: 2024-03-12

## 2024-03-12 RX ORDER — ASPIRIN 81 MG/1
81 TABLET ORAL 2 TIMES DAILY
Qty: 60 TABLET | Refills: 0 | Status: SHIPPED | OUTPATIENT
Start: 2024-03-12 | End: 2024-04-11

## 2024-03-12 RX ORDER — CYCLOBENZAPRINE HCL 5 MG
5 TABLET ORAL 3 TIMES DAILY
Qty: 21 TABLET | Refills: 0 | Status: SHIPPED | OUTPATIENT
Start: 2024-03-12 | End: 2024-03-27 | Stop reason: SDUPTHER

## 2024-03-12 RX ORDER — OXYCODONE HYDROCHLORIDE 5 MG/1
5 TABLET ORAL EVERY 6 HOURS PRN
Qty: 28 TABLET | Refills: 0 | Status: SHIPPED | OUTPATIENT
Start: 2024-03-12 | End: 2024-03-19

## 2024-03-12 RX ORDER — PANTOPRAZOLE SODIUM 40 MG/1
40 TABLET, DELAYED RELEASE ORAL
Status: DISCONTINUED | OUTPATIENT
Start: 2024-03-13 | End: 2024-03-12 | Stop reason: HOSPADM

## 2024-03-12 RX ORDER — DOCUSATE SODIUM 100 MG/1
100 CAPSULE, LIQUID FILLED ORAL 2 TIMES DAILY
Qty: 20 CAPSULE | Refills: 0 | Status: SHIPPED | OUTPATIENT
Start: 2024-03-12 | End: 2024-03-22

## 2024-03-12 RX ORDER — ACETAMINOPHEN 500 MG
1000 TABLET ORAL EVERY 8 HOURS PRN
Qty: 42 TABLET | Refills: 0 | Status: SHIPPED | OUTPATIENT
Start: 2024-03-12 | End: 2024-03-26

## 2024-03-12 RX ORDER — ASPIRIN 81 MG/1
81 TABLET ORAL 2 TIMES DAILY
Status: DISCONTINUED | OUTPATIENT
Start: 2024-03-12 | End: 2024-03-12 | Stop reason: HOSPADM

## 2024-03-12 RX ADMIN — CYCLOBENZAPRINE HYDROCHLORIDE 5 MG: 10 TABLET, FILM COATED ORAL at 15:53

## 2024-03-12 RX ADMIN — METOPROLOL SUCCINATE 25 MG: 25 TABLET, EXTENDED RELEASE ORAL at 08:40

## 2024-03-12 RX ADMIN — FLUTICASONE FUROATE AND VILANTEROL TRIFENATATE 1 PUFF: 200; 25 POWDER RESPIRATORY (INHALATION) at 08:41

## 2024-03-12 RX ADMIN — CEFAZOLIN SODIUM 2 G: 2 INJECTION, SOLUTION INTRAVENOUS at 05:54

## 2024-03-12 RX ADMIN — ASPIRIN 81 MG: 81 TABLET, COATED ORAL at 08:40

## 2024-03-12 RX ADMIN — ACETAMINOPHEN 650 MG: 325 TABLET ORAL at 05:56

## 2024-03-12 RX ADMIN — CYCLOBENZAPRINE HYDROCHLORIDE 5 MG: 10 TABLET, FILM COATED ORAL at 08:40

## 2024-03-12 RX ADMIN — ALPRAZOLAM 0.5 MG: 0.5 TABLET ORAL at 12:18

## 2024-03-12 RX ADMIN — OXYCODONE HYDROCHLORIDE 5 MG: 5 TABLET ORAL at 05:57

## 2024-03-12 RX ADMIN — ATORVASTATIN CALCIUM 80 MG: 80 TABLET, FILM COATED ORAL at 08:40

## 2024-03-12 RX ADMIN — OXYCODONE HYDROCHLORIDE 10 MG: 5 SOLUTION ORAL at 14:59

## 2024-03-12 RX ADMIN — PANTOPRAZOLE SODIUM 40 MG: 40 TABLET, DELAYED RELEASE ORAL at 13:01

## 2024-03-12 RX ADMIN — KETOROLAC TROMETHAMINE 30 MG: 30 INJECTION, SOLUTION INTRAMUSCULAR at 07:20

## 2024-03-12 RX ADMIN — ACETAMINOPHEN 650 MG: 325 TABLET ORAL at 12:18

## 2024-03-12 ASSESSMENT — COGNITIVE AND FUNCTIONAL STATUS - GENERAL
STANDING UP FROM CHAIR USING ARMS: A LITTLE
TOILETING: A LITTLE
WALKING IN HOSPITAL ROOM: A LITTLE
WALKING IN HOSPITAL ROOM: A LITTLE
STANDING UP FROM CHAIR USING ARMS: A LITTLE
DAILY ACTIVITIY SCORE: 20
MOVING TO AND FROM BED TO CHAIR: A LITTLE
MOVING TO AND FROM BED TO CHAIR: A LITTLE
PERSONAL GROOMING: A LITTLE
DRESSING REGULAR LOWER BODY CLOTHING: A LITTLE
MOBILITY SCORE: 17
HELP NEEDED FOR BATHING: A LITTLE
CLIMB 3 TO 5 STEPS WITH RAILING: A LOT
TURNING FROM BACK TO SIDE WHILE IN FLAT BAD: A LITTLE
MOBILITY SCORE: 18
CLIMB 3 TO 5 STEPS WITH RAILING: TOTAL
TURNING FROM BACK TO SIDE WHILE IN FLAT BAD: A LITTLE

## 2024-03-12 ASSESSMENT — PAIN SCALES - GENERAL
PAINLEVEL_OUTOF10: 6
PAINLEVEL_OUTOF10: 6
PAINLEVEL_OUTOF10: 4
PAINLEVEL_OUTOF10: 4
PAINLEVEL_OUTOF10: 7

## 2024-03-12 ASSESSMENT — PAIN - FUNCTIONAL ASSESSMENT
PAIN_FUNCTIONAL_ASSESSMENT: 0-10
PAIN_FUNCTIONAL_ASSESSMENT: 0-10

## 2024-03-12 ASSESSMENT — ACTIVITIES OF DAILY LIVING (ADL): BATHING_ASSISTANCE: STAND BY

## 2024-03-12 NOTE — PROGRESS NOTES
PROGRESS NOTE    ASSESSMENT AND PLAN:     #Trimalleolar fracture  -P/W a mechanical fall  -X-ray of right foot shows a trimalleolar fracture  -Currently plan for orthopedic intervention  -Continue pain control    # Hypertension  -Continue metoprolol      #asthma  #Generalized anxiety disorder  #Obesity  #Asthma  # Right cervical radiculopathy    -Continue home medications        SUBJECTIVE:   Admit Date: 3/10/2024    Interval History: Feels okay, complains of pain.      OBJECTIVE:   Vitals: /67 (Patient Position: Lying)   Pulse 85   Temp 37 °C (98.6 °F) (Temporal)   Resp 16   Ht 1.524 m (5')   Wt 102 kg (225 lb)   SpO2 94%   BMI 43.94 kg/m²    Wt Readings from Last 3 Encounters:   03/10/24 102 kg (225 lb)      24HR INTAKE/OUTPUT:    Intake/Output Summary (Last 24 hours) at 3/12/2024 1007  Last data filed at 3/12/2024 0415  Gross per 24 hour   Intake 1350 ml   Output --   Net 1350 ml       PHYSICAL EXAM:   GENERAL: Laying in bed, does not appear to be in any distress.   HEENT: HEAD: Normocephalic atraumatic.  Neck: Supple.  Eyes: Pupils are reactive to direct light.   CVS: S1, S2 heard. Regular rate and rhythm  LUNGS: Clear to auscultate bilaterally. No wheezing or rhonchi appreciated.  ABDOMEN: Soft, nontender to palpate. Positive bowel sounds. No guarding or rebound appreciated.  NEUROLOGICAL: No focal neurological deficits appreciated. Cranial nerves are grossly intact.  EXTREMITIES: No edema appreciated.  Right lower extremity is concealed with dressing.  SKIN:  Grossly intact, warm and dry.      LABS/IMAGING AND MEDICATIONS:   Scheduled Meds:acetaminophen, 650 mg, oral, q6h JAMES  aspirin, 81 mg, oral, BID  atorvastatin, 80 mg, oral, Daily  cyclobenzaprine, 5 mg, oral, TID  docusate sodium, 100 mg, oral, BID  influenza, 0.5 mL, intramuscular, During hospitalization  fluticasone furoate-vilanteroL, 1 puff, inhalation, Daily  metoprolol succinate XL, 25 mg, oral, Daily  propofol, 100 mg, intravenous,  "Once  sennosides, 2 tablet, oral, BID      PRN Meds:PRN medications: acetaminophen **OR** acetaminophen **OR** acetaminophen, acetaminophen **OR** acetaminophen **OR** acetaminophen, albuterol, ALPRAZolam, benzocaine-menthol, dextromethorphan-guaifenesin, diazePAM, guaiFENesin, ketorolac, morphine, morphine, naloxone, ondansetron ODT **OR** ondansetron, oxyCODONE, oxyCODONE, propofol **FOLLOWED BY** propofol, SUMAtriptan    No lab exists for component: \"CBC\"   No lab exists for component: \"CMP\"   No lab exists for component: \"TROPONIN\"      Results from last 7 days   Lab Units 03/10/24  1525   INR  1.0     No lab exists for component: \"LIPIDS\"       No lab exists for component: \"URINALYSIS\"          BMP:  Results from last 7 days   Lab Units 03/11/24  0733 03/10/24  1525   SODIUM mmol/L 138 136   POTASSIUM mmol/L 4.0 4.4   CHLORIDE mmol/L 105 105   CO2 mmol/L 27 23   BUN mg/dL 19 15   CREATININE mg/dL 1.01 0.93       CBC:  Results from last 7 days   Lab Units 03/11/24  0733 03/10/24  1525   WBC AUTO x10*3/uL 5.4 10.9   RBC AUTO x10*6/uL 4.39 4.58   HEMOGLOBIN g/dL 12.4 13.0   HEMATOCRIT % 38.0 39.2   MCV fL 87 86   MCH pg 28.2 28.4   MCHC g/dL 32.6 33.2   RDW % 13.6 13.0   PLATELETS AUTO x10*3/uL 251 276       Cardiac Enzymes:           Hepatic Function Panel:  Results from last 7 days   Lab Units 03/11/24  0733 03/10/24  1525   ALK PHOS U/L 73 77   ALT U/L 15 14   AST U/L 17 17   PROTEIN TOTAL g/dL 6.7 6.7   BILIRUBIN TOTAL mg/dL 0.4 0.3       Magnesium:        Pro-BNP:  No results found for: \"PROBNP\"    INR:  Results from last 7 days   Lab Units 03/10/24  1525   PROTIME seconds 11.2   INR  1.0       TSH:  No results found for: \"TSH\"    Lipid Profile:        No lab exists for component: \"LABVLDL\"    HgbA1C:        Lactate Level:  No lab exists for component: \"LACTA\"    CMP:  Results from last 7 days   Lab Units 03/11/24  0733 03/10/24  1525   SODIUM mmol/L 138 136   POTASSIUM mmol/L 4.0 4.4   CHLORIDE mmol/L 105 " 105   CO2 mmol/L 27 23   BUN mg/dL 19 15   CREATININE mg/dL 1.01 0.93   GLUCOSE mg/dL 110* 108*   CALCIUM mg/dL 8.9 8.9   PROTEIN TOTAL g/dL 6.7 6.7   BILIRUBIN TOTAL mg/dL 0.4 0.3   ALK PHOS U/L 73 77   AST U/L 17 17   ALT U/L 15 14

## 2024-03-12 NOTE — DISCHARGE SUMMARY
Discharge Diagnosis  Trimalleolar fracture of ankle, closed, right, initial encounter    Issues Requiring Follow-Up  ***    Discharge Meds     Your medication list        START taking these medications        Instructions Last Dose Given Next Dose Due   aspirin 81 mg EC tablet      Take 1 tablet (81 mg) by mouth 2 times a day.       cyclobenzaprine 5 mg tablet  Commonly known as: Flexeril      Take 1 tablet (5 mg) by mouth 3 times a day for 7 days.       docusate sodium 100 mg capsule  Commonly known as: Colace      Take 1 capsule (100 mg) by mouth 2 times a day for 10 days.       oxyCODONE 5 mg immediate release tablet  Commonly known as: Roxicodone      Take 1 tablet (5 mg) by mouth every 6 hours if needed for moderate pain (4 - 6) or severe pain (7 - 10) for up to 7 days.       Pain Reliever (acetaminophen) 500 mg tablet  Generic drug: acetaminophen      Take 2 tablets (1,000 mg) by mouth every 8 hours if needed for mild pain (1 - 3) or moderate pain (4 - 6) for up to 14 days.              CONTINUE taking these medications        Instructions Last Dose Given Next Dose Due   ALPRAZolam 0.5 mg tablet  Commonly known as: Xanax           atorvastatin 80 mg tablet  Commonly known as: Lipitor           metoprolol succinate XL 25 mg 24 hr tablet  Commonly known as: Toprol-XL           omeprazole 40 mg DR capsule  Commonly known as: PriLOSEC           Proventil HFA 90 mcg/actuation inhaler  Generic drug: albuterol           SUMAtriptan 50 mg tablet  Commonly known as: Imitrex           Symbicort 160-4.5 mcg/actuation inhaler  Generic drug: budesonide-formoteroL                     Where to Get Your Medications        These medications were sent to [facility]  [address] [place] [id number]      Hours: 9 AM to 5:30 PM Mon-Fri, 9 AM to 1 PM Saturday Phone: [phone]   aspirin 81 mg EC tablet  cyclobenzaprine 5 mg tablet  docusate sodium 100 mg capsule  oxyCODONE 5 mg immediate release  tablet  Pain Reliever (acetaminophen) 500 mg tablet         Test Results Pending At Discharge  Pending Labs       No current pending labs.            Hospital Course   ***    Pertinent Physical Exam At Time of Discharge  Physical Exam    Outpatient Follow-Up  No future appointments.      Herminia Sr MD

## 2024-03-12 NOTE — PROGRESS NOTES
Occupational Therapy    Evaluation    Patient Name: Gita Kincaid  MRN: [id number]  Today's Date: 3/12/2024  Time Calculation  Start Time: 0903  Stop Time: 0917  Time Calculation (min): 14 min        Assessment:  OT Assessment: SBA for safety with mobility and ADLs  Prognosis: Good  End of Session Patient Position: Alarm on, Up in chair  OT Assessment Results: Decreased ADL status, Decreased endurance  Prognosis: Good  Plan:  Treatment Interventions: ADL retraining, Functional transfer training, Endurance training  OT Frequency: 2 times per week  OT Discharge Recommendations: Low intensity level of continued care  Equipment Recommended upon Discharge:  (W/C)  OT - OK to Discharge: Yes (when medically stable/cleared)      Subjective   Current Problem:  1. Closed fracture of right ankle, initial encounter  Case Request Operating Room: Open Reduction Internal Fixation Ankle    Case Request Operating Room: Open Reduction Internal Fixation Ankle    acetaminophen (Tylenol) 500 mg tablet    cyclobenzaprine (Flexeril) 5 mg tablet    docusate sodium (Colace) 100 mg capsule    oxyCODONE (Roxicodone) 5 mg immediate release tablet    aspirin 81 mg EC tablet      2. Fall, initial encounter        3. Acute right ankle pain        4. Closed trimalleolar fracture of right ankle, initial encounter          General:  General  Reason for Referral: ADL impairment s/p ORIF R ankle  Referred By: João, 3/11, OT/PT  Past Medical History Relevant to Rehab: Asthma, COPD, anxiety  Family/Caregiver Present: No  Prior to Session Communication: Bedside nurse  Patient Position Received: Alarm on, Bed, 3 rail up  General Comment: Pt. is 57 y/o female to ED 3/10 s/p fall while walking dog. XR 3/10 (+) Trimalleolar fx/subluxation. Pt. to OR for ORIF of R ankle on 3/11 with Dr. Lechuga.  Precautions:  LE Weight Bearing Status: Right Non-Weight Bearing    Pain:  Pain Assessment  Pain Assessment: 0-10  Pain Score: 4  Pain Type: Surgical pain  Pain  Location: Ankle  Pain Orientation: Right    Objective   Cognition:  Overall Cognitive Status: Within Functional Limits  Orientation Level: Oriented X4    Home Living:  Home Living Comments: pt. lives alone in mobile home, 4 AAKASH with B HRs. Has walk in shower and grab bars. no shower chair.  Prior Function:  Prior Function Comments: Independent with ADLs/IADLs. Occasional cane use. owns WW. 1 fall PTA. Drives.    ADL:  Eating Assistance: Independent  Grooming Assistance: Stand by  Bathing Assistance: Stand by  UE Dressing Assistance: Independent  LE Dressing Assistance: Stand by  Toileting Assistance with Device: Stand by  Activity Tolerance:  Endurance: Decreased tolerance for upright activites  Activity Tolerance Comments: SOB/fatigue with hopping with WW  Bed Mobility/Transfers: Bed Mobility  Bed Mobility: Yes  Bed Mobility 1  Bed Mobility 1: Supine to sitting  Level of Assistance 1: Modified independent  Bed Mobility Comments 1: head of bed elevated    Transfers  Transfer: Yes  Transfer 1  Technique 1: Sit to stand, Stand to sit  Transfer Device 1: Walker  Trials/Comments 1: SBA for safety; VCs for safe hand placement on EOB. VCs to reach back for surface when sitting.    Functional Mobility:  Functional Mobility  Functional Mobility Performed:  (NWB RLE; use of WW. Able to demonstrate LLE hopping ~25 feet. Fatigues easily)    Standing Balance:  Dynamic Standing Balance  Dynamic Standing-Comments: Fair +     Strength:  Strength Comments: DOMINIQUEEs 5/5 MMT    Extremities: RUE   RUE : Within Functional Limits and LUE   LUE: Within Functional Limits    Outcome Measures:[facility] Daily Activity  Putting on and taking off regular lower body clothing: A little  Bathing (including washing, rinsing, drying): A little  Putting on and taking off regular upper body clothing: None  Toileting, which includes using toilet, bedpan or urinal: A little  Taking care of personal grooming such as brushing teeth: A little  Eating Meals:  None  Daily Activity - Total Score: 20      Education Documentation  Precautions, taught by Irma Diane OT at 3/12/2024 12:14 PM.  Learner: Patient  Readiness: Eager  Method: Explanation, Demonstration  Response: Verbalizes Understanding, Needs Reinforcement    ADL Training, taught by Irma Diane OT at 3/12/2024 12:14 PM.  Learner: Patient  Readiness: Eager  Method: Explanation, Demonstration  Response: Verbalizes Understanding, Needs Reinforcement      IP EDUCATION:  Education  Individual(s) Educated: Patient  Education Provided: Diagnosis & Precautions, Fall precautons, Risk and benefits of OT discussed with patient or other, POC discussed and agreed upon (W/C use around home)    Goals:  Encounter Problems       Encounter Problems (Active)       OT Goals       Mod I for all functional transfers  (Progressing)       Start:  03/12/24    Expected End:  03/26/24            Mod I LB dressing; AE use.  (Progressing)       Start:  03/12/24    Expected End:  03/26/24            Mod I toileting tasks and clothing mgmt (Progressing)       Start:  03/12/24    Expected End:  03/26/24            good dyn standing balance during transfers, ADLs, and functional activities  (Progressing)       Start:  03/12/24    Expected End:  03/26/24

## 2024-03-12 NOTE — PROGRESS NOTES
Physical Therapy    Physical Therapy Evaluation    Patient Name: Gita Kincaid  MRN: [id number]  Today's Date: 3/12/2024   Time Calculation  Start Time: 0902  Stop Time: 0915  Time Calculation (min): 13 min    Assessment/Plan   PT Assessment  PT Assessment Results: Decreased endurance, Decreased mobility, Decreased safety awareness, Impaired balance  Rehab Prognosis: Good  Evaluation/Treatment Tolerance: Patient limited by fatigue  Assessment Comment: Patient will benefit from additional PT to address stair climbing, use of wheelchair and reinforce hand position for sit <> stand  End of Session Patient Position: Up in chair, Alarm on (LEs elevated)  IP OR SWING BED PT PLAN  Inpatient or Swing Bed: Inpatient  PT Plan  Treatment/Interventions: Bed mobility, Transfer training, Gait training, Strengthening, Therapeutic activity  PT Plan: Skilled PT  PT Frequency: BID  PT Discharge Recommendations:  (HHPT)  Equipment Recommended upon Discharge: Wheelchair  PT Recommended Transfer Status: Assist x1  PT - OK to Discharge:  (Once deemed medically appropriate with famly support and continued PT)      Subjective   General Visit Information:  General  Reason for Referral: s/p ORIF Right Ankle 3/11/24  Referred By: PT/OT 3/11/24 Efren/Ankush  Past Medical History Relevant to Rehab: Asthma, COPD, anxiety,  Patient Position Received: Bed, 2 rail up, Alarm on  General Comment: To ED 3/10/24 iwth right ankle pain walking dog and pulled her and slipped and fell. XR 3/10/24 Right ankle Trimalleolar fracture  Home Living:  Home Living  Home Living Comments: Per patient report resides alone in mobile home 4 steps with handrails bilateral. Walk in shower without seat and has grab bar.  Prior Level of Function:  Prior Function Per Pt/Caregiver Report  Prior Function Comments: Per patient report independent in mobility, ADLs and IADLs with cane PRN. Reports only this  1 fall. Drives.  Precautions:  Precautions  LE Weight Bearing Status:   (NWB RLE)  Medical Precautions: Fall precautions         Objective   Pain:  Pain Assessment  Pain Score: 6  Pain Location: Ankle  Pain Orientation: Right  Cognition:   Alert and oriented x 3     General Assessments:  General Observation  General Observation: Patient lying in bed with gutter splint RLE elevated on pillows. Agreeable to PT/OT     Activity Tolerance  Endurance:  (Patient very winded with ambulating 20')    Static Sitting Balance  Static Sitting-Comment/Number of Minutes: Good  Dynamic Sitting Balance  Dynamic Sitting-Comments: Good    Static Standing Balance  Static Standing-Comment/Number of Minutes: Fair +  Dynamic Standing Balance  Dynamic Standing-Comments: Fair  Functional Assessments:  Bed Mobility  Bed Mobility:  (Supine > sit with HOB 30 degrees modified independent)    Transfers  Transfer:  (Sit > stand at bed level CGA, vc for hand placement. Stand > sit at recliner SBA, vc to reach back, however patient sat down before instructions provided. Will benefit from further reinforcement.)    Ambulation/Gait Training  Ambulation/Gait Training Performed:  (Patient ambulated NWB RLE 20' with ww with (1) 180 degree turn without LOB CGA. Patient demonstrates good maintanence of NWB with good control. Fatigues with minimal distance. discussed recommendation of acquiring wheelchair.)  Extremity/Trunk Assessments:  RUE   RUE : Within Functional Limits  LUE   LUE: Within Functional Limits  RLE   RLE :  (AROM Hip/knee WFL ankle not tested secondary to splint. MMT NT)  LLE   LLE :  (AROM Hip/knee/ankle WFL MMTM 5/5 grossly)  Outcome Measures:  [facility] Basic Mobility  Turning from your back to your side while in a flat bed without using bedrails: None  Moving from lying on your back to sitting on the side of a flat bed without using bedrails: A little  Moving to and from bed to chair (including a wheelchair): A little  Standing up from a chair using your arms (e.g. wheelchair or bedside chair): A little  To  walk in hospital room: A little  Climbing 3-5 steps with railing: A lot  Basic Mobility - Total Score: 18    Encounter Problems       Encounter Problems (Active)       PT Problem       Transfers sit <> stand with ww modified independent  (Progressing)       Start:  03/12/24    Expected End:  04/09/24            Patient ambulated 20' with ww modified independent  (Progressing)       Start:  03/12/24    Expected End:  04/09/24            Patient to negotiate 4 steps with handrail and crutch CGA (Not Progressing)       Start:  03/12/24    Expected End:  04/09/24               PT Problem       Patient to mobilize wheelchair > 40 independently around objects (Not Progressing)       Start:  03/12/24    Expected End:  04/09/24               Pain - Adult              Education Documentation  Precautions, taught by Viv Pringle, PT at 3/12/2024 12:14 PM.  Learner: Patient  Readiness: Acceptance  Method: Explanation, Demonstration  Response: Demonstrated Understanding  Comment: Educated on NWB RLE. Suggested she may do welll with wheelchair due to fatigue with short distance hopping    Mobility Training, taught by Viv Pringle, PT at 3/12/2024 12:14 PM.  Learner: Patient  Readiness: Acceptance  Method: Explanation, Demonstration  Response: Demonstrated Understanding  Comment: Educated on NWB RLE. Suggested she may do welll with wheelchair due to fatigue with short distance hopping

## 2024-03-12 NOTE — DISCHARGE INSTRUCTIONS
Open Reduction Internal Fixation Lower Extremity   Discharge Instructions    To prevent Clot formation, you have been placed on the following medication:  Aspirin 81 mg BID for 30 days. Started on 3/12/24  Surgical Site Care:  Change dressing once a day and PRN (as needed). Apply 4 x 4 sponge and light tape. If glue present, leave open to air.  You may leave wound open to air after initial dressing removal, if wound is clean, dry and intact  If Aquacel Ag dressing is present, do not remove dressing for 7 days, unless heavily saturated. If heavily saturated, remove dressing and start using instructions above  Staples will be removed on post-operative day 14 and steri-strips applied  Showering is permitted starting POD1 if waterproof Aquacel dressing is present or when the incision is covered with 4 x 4 and Tegaderm waterproof dressing  Until all areas of incision are healed.    Physical Therapy:  Weight Bearing Status:  Non-weightbearing to operative extremity      Pain Medications  You were given  oxycodone  Wean off pain medications as you deem appropriate as long as pain is under control  Cold packs/Ice packs/Machine  May be used 3 times daily for 15-30 minutes as necessary  Be sure to have a barrier (cloth, clothing, towel) between the site and the ice pack to prevent frostbite  Contact [facility] office if  Increased redness, swelling, drainage of any kind, and/or pain to surgery site.  As well as new onset fevers and or chills.  These could signify an infection.  Calf or thigh tenderness to touch as well as increased swelling or redness.  This could signify a clot formation.  Numbness or tingling to an area around the incision site or below the incision site (toes).  Any rash appears, increased  or new onset nausea/vomiting occur.  This may indicate a reaction to a medication.  Phone # [phone].  Follow up with Surgeon in 2 weeks  I acknowledge that I have received kishor hose and understand the  instructions on how and when to wear them (on during the day, off at night)   Discharging RN who has gone over instructions and acknowledges kishor hose have been received

## 2024-03-12 NOTE — PROGRESS NOTES
Physical Therapy    Physical Therapy Treatment    Patient Name: Gita Kincaid  MRN: [id number]  Today's Date: 3/12/2024  Time Calculation  Start Time: 1316  Stop Time: 1425  Time Calculation (min): 69 min       Assessment/Plan   PT Assessment  PT Assessment Results: Decreased endurance, Decreased mobility, Decreased safety awareness, Impaired balance  Rehab Prognosis: Good  Evaluation/Treatment Tolerance: Patient limited by fatigue  Assessment Comment: pt would benefit from further therapy to maximize safety and functional mobility  End of Session Patient Position: Up in chair, Alarm on (LEs elevated)     Treatment/Interventions: Transfer training, Gait training, Stair training, Therapeutic activity, Wheelchair management  PT Plan: Skilled PT  PT Frequency: BID  PT Discharge Recommendations: Moderate intensity level of continued care vs (HHPT)  Equipment Recommended upon Discharge: Wheelchair   PT Recommended Transfer Status: Assist x1, Assistive device    General Visit Information:   PT  Visit  PT Received On: 03/12/24  General  Reason for Referral: s/p ORIF Right Ankle 3/11/24  Referred By: PT/OT 3/11/24 Efren/Ankush  Past Medical History Relevant to Rehab: Asthma, COPD, anxiety,  Family/Caregiver Present: No  Prior to Session Communication: Bedside nurse (cleared by nursing to participate)  Patient Position Received: Alarm on, Up in chair  General Comment: pt states she plans to go home and has a son that can help her get in the house . Pt is requesting W/C for home , stating she will be aable to get around with the use of W/C and that she has used one before , feels she can navigate safely with it    General Observations:   General Observation: drainage noted at lateral aspect of ankle on dressing , nursing notified , stating that  it is not new drainage    Subjective     Precautions:  Precautions  LE Weight Bearing Status: Right Non-Weight Bearing (NWB RLE)  Medical Precautions: Fall precautions  Precautions  "Comment: pt able to maintain NWB during treatement    Vital Signs:     Objective     Pain:  Pain Assessment  Pain Assessment: 0-10  Pain Score: 6  Pain Type: Surgical pain  Pain Location: Ankle  Pain Orientation: Right    Cognition:  Cognition  Overall Cognitive Status: Within Functional Limits  Orientation Level: Oriented X4      Activity Tolerance:  Activity Tolerance  Endurance: Decreased tolerance for upright activites    Treatments:     Bed Mobility  Bed Mobility: No (seated upon arrival)    Ambulation/Gait Training  Ambulation/Gait Training Performed: Yes (ambualitng 5ft x2 and 10 ft x 2 with WW and CGA while maintaining  NWB. tolerated static stance for up to 2 min interval with WW and CGA)    Transfers  Transfer: Yes  Transfer 1  Technique 1: Sit to stand, Stand to sit  Trials/Comments 1: transfers sit <--> stand with CGA and vc for hand placement , performed from chair with arms and W/C .  Pt demonstrated good safety awareness with W/C, consistenly making sure W/C brakes are looked prior to transfers  Transfers 2  Trials/Comments 2: transfers to and from bedside commode with CGA and vc for hand placement    Stairs  Stairs: Yes (pt attempted stairs with gait belt , use of B railings  and Max A x 2 , pt unable to perform , states \"I just cant do it \" instructed in technique and attempted x 3 trials .  pt unable,)    pt asking about crawling up stairs or going up on her backside , explained why that would be  unsafe.     crutches adjusted to correct height for when able to use (pt states that she plans on going home upon discahrge and will get in the house somehow, states she will have her son take her in using the W/C if needed . Recomeded that pt have 2 people assist her to get in the home for safety)       Outcome Measures:  [facility] Basic Mobility  Turning from your back to your side while in a flat bed without using bedrails: None  Moving from lying on your back to sitting on the side of a flat bed without " "using bedrails: A little  Moving to and from bed to chair (including a wheelchair): A little  Standing up from a chair using your arms (e.g. wheelchair or bedside chair): A little  To walk in hospital room: A little  Climbing 3-5 steps with railing: Total  Basic Mobility - Total Score: 17      EDUCATION:  Outpatient Education  Individual(s) Educated: Patient  Education Provided: Body Mechanics, Fall Risk, Home Safety, Other  Patient Response to Education: Patient/Caregiver Verbalized Understanding of Information  Education Comment: discussed W/C mobiity  as well  as how to use crutch and single HR to navigate stairs (pt instructed in proper use of crutches , pt asking \" why can't I rest my armpits on the crutches \")    Encounter Problems       Encounter Problems (Active)       PT Problem       Transfers sit <> stand with ww modified independent  (Progressing)       Start:  03/12/24    Expected End:  04/09/24            Patient ambulated 20' with ww modified independent  (Progressing)       Start:  03/12/24    Expected End:  04/09/24            Patient to negotiate 4 steps with handrail and crutch CGA (Not Progressing)       Start:  03/12/24    Expected End:  04/09/24               PT Problem       Patient to mobilize wheelchair > 40 independently around objects (Not Progressing)       Start:  03/12/24    Expected End:  04/09/24               Pain - Adult              "

## 2024-03-12 NOTE — PROGRESS NOTES
03/12/24 1818   Discharge Planning   Do you have animals or pets at home? No   Home or Post Acute Services In home services   Type of Home Care Services Home health aide;Home OT;Home nursing visits;DME or oxygen   Patient expects to be discharged to: Home with [facility] + wheelchair   Does the patient need discharge transport arranged? No     Per rounding report with Orthopedics team, pt is POD #1 s/p ORIF right ankle fracture. AMPAC scores are PT (18) OT (20), no further PT needs but OT recommends continued therapy at low level/intensity. Pt lives alone and prefers home with [facility]. [facility] states unable to accept d/t no staffing,  pts #2 AOC is [facility]. Pt also requesting wheelchair for home d/t non weight bearing. Donna @ Medical Services notified. Dr. Sr notified and agreeable to order HHC. [facility]  notified of HHC referral/HCO in Epic. Pt Demographics verified.    Update: [facility] has confirmed that referral received and processed for SOC in 24-48 hours. Also confirmed that pts wheelchair was delivered to her room.

## 2024-03-12 NOTE — PROGRESS NOTES
Progress Note    Subjective:     Post-Operative Day: 1 Status Post right ORIF  Systemic or Specific Complaints:No Complaints    Objective:     Visit Vitals  /71   Pulse 104   Temp 36 °C (96.8 °F)   Resp 16       General: alert and oriented, in no acute distress and cooperative   Wound: no erythema, no edema, no drainage, and dressing is clean, dry, and intact   Motion: Painful range of Motion   DVT Exam: No evidence of DVT seen on physical exam.  Negative Pineda's sign.  No significant calf/ankle edema.       NVI in lower extremity. Thigh swollen but soft. Moving foot and ankle.      Data Review  Recent Results (from the past 24 hour(s))   ECG 12 lead    Collection Time: 03/11/24  8:51 AM   Result Value Ref Range    Ventricular Rate 81 BPM    Atrial Rate 81 BPM    TX Interval 184 ms    QRS Duration 90 ms    QT Interval 396 ms    QTC Calculation(Bazett) 460 ms    P Axis 66 degrees    R Axis 41 degrees    T Axis 51 degrees    QRS Count 13 beats    Q Onset 221 ms    P Onset 129 ms    P Offset 185 ms    T Offset 419 ms    QTC Fredericia 437 ms       Assessment:     Status Post right ORIF. Doing well postoperatively. No events overnight.     Plan:     1:  Continue Deep venous thrombosis prophylaxis: Aspirin 81 mg BID for 30 days  2:  Continue physical therapy: non-weightbearing to operative extremity  3:  Continue Pain Control, scripts sent  4:  Follow up with surgeon in office in 2 weeks  5.  Discharge home today    NEVAEH Resendez   3/12/2024 7:51 AM

## 2024-03-12 NOTE — PROGRESS NOTES
03/12/24 1045   Discharge Planning   Home or Post Acute Services None   Patient expects to be discharged to: Home     Per rounding report with Orthopedics team, pt is POD #1 s/p ORIF right ankle fracture, has no home going PT/OT needs, anticipate Home no needs. CT team will continue to monitor case for progression and potential DC needs.

## 2024-03-12 NOTE — CARE PLAN
The patient's goals for the shift include  discharge.    The clinical goals for the shift include Patient will be free of injury by end of shift    Problem: Pain - Adult  Goal: Verbalizes/displays adequate comfort level or baseline comfort level  Outcome: Met     Problem: Safety - Adult  Goal: Free from fall injury  Outcome: Met     Problem: Discharge Planning  Goal: Discharge to home or other facility with appropriate resources  Outcome: Met     Problem: Chronic Conditions and Co-morbidities  Goal: Patient's chronic conditions and co-morbidity symptoms are monitored and maintained or improved  Outcome: Met     Problem: Pain  Goal: Takes deep breaths with improved pain control throughout the shift  Outcome: Met  Goal: Turns in bed with improved pain control throughout the shift  Outcome: Met  Goal: Walks with improved pain control throughout the shift  Outcome: Met  Goal: Performs ADL's with improved pain control throughout shift  Outcome: Met  Goal: Participates in PT with improved pain control throughout the shift  Outcome: Met  Goal: Free from opioid side effects throughout the shift  Outcome: Met  Goal: Free from acute confusion related to pain meds throughout the shift  Outcome: Met     Problem: Fall/Injury  Goal: Not fall by end of shift  Outcome: Met  Goal: Be free from injury by end of the shift  Outcome: Met  Goal: Verbalize understanding of personal risk factors for fall in the hospital  Outcome: Met  Goal: Verbalize understanding of risk factor reduction measures to prevent injury from fall in the home  Outcome: Met  Goal: Use assistive devices by end of the shift  Outcome: Met  Goal: Pace activities to prevent fatigue by end of the shift  Outcome: Met

## 2024-03-14 DIAGNOSIS — S82.851A TRIMALLEOLAR FRACTURE OF ANKLE, CLOSED, RIGHT, INITIAL ENCOUNTER: Primary | ICD-10-CM

## 2024-03-17 LAB
ATRIAL RATE: 81 BPM
P AXIS: 66 DEGREES
P OFFSET: 185 MS
P ONSET: 129 MS
PR INTERVAL: 184 MS
Q ONSET: 221 MS
QRS COUNT: 13 BEATS
QRS DURATION: 90 MS
QT INTERVAL: 396 MS
QTC CALCULATION(BAZETT): 460 MS
QTC FREDERICIA: 437 MS
R AXIS: 41 DEGREES
T AXIS: 51 DEGREES
T OFFSET: 419 MS
VENTRICULAR RATE: 81 BPM

## 2024-03-18 ENCOUNTER — HOME CARE VISIT (OUTPATIENT)
Dept: HOME HEALTH SERVICES | Facility: HOME HEALTH | Age: 59
End: 2024-03-18
Payer: MEDICARE

## 2024-03-18 VITALS — HEART RATE: 82 BPM | SYSTOLIC BLOOD PRESSURE: 142 MMHG | DIASTOLIC BLOOD PRESSURE: 82 MMHG | TEMPERATURE: 97.9 F

## 2024-03-18 PROCEDURE — 169592 NO-PAY CLAIM PROCEDURE

## 2024-03-18 PROCEDURE — G0151 HHCP-SERV OF PT,EA 15 MIN: HCPCS | Mod: HHH

## 2024-03-18 PROCEDURE — 1090000002 HH PPS REVENUE DEBIT

## 2024-03-18 PROCEDURE — 1090000001 HH PPS REVENUE CREDIT

## 2024-03-18 PROCEDURE — 0023 HH SOC

## 2024-03-18 ASSESSMENT — ENCOUNTER SYMPTOMS
OCCASIONAL FEELINGS OF UNSTEADINESS: 1
HIGHEST PAIN SEVERITY IN PAST 24 HOURS: 7/10
PAIN LOCATION - PAIN SEVERITY: 3/10
SUBJECTIVE PAIN PROGRESSION: GRADUALLY IMPROVING
PAIN LOCATION: RIGHT LEG
PERSON REPORTING PAIN: PATIENT
PAIN: 1

## 2024-03-18 ASSESSMENT — ACTIVITIES OF DAILY LIVING (ADL)
AMBULATION ASSISTANCE ON FLAT SURFACES: 1
ENTERING_EXITING_HOME: NEEDS ASSISTANCE
OASIS_M1830: 05

## 2024-03-18 NOTE — HOME HEALTH
Patient fell while outside of her [place] mobile home suffering trimaleolar fracture. Underwent ORIF 3/11 and was discharged to her son's home in [place] 3/12. Prior level of function was independent with all ADLs and IADLs without a device. Currently NWB to RLE as directed by surgeon. In son's home she stays on first level and is unable to access kitchen area due to 3 steps. This visit demonstrated safe chair to wheelchair transfers while maintaining precautions, SBA for sit to stand from wheelchair to walker, and SBA for short distance level surface gait with walker. Unable to ascend or descend steps at this time. Pain in RLE increased when in a dependent position. Patient will continue to benefit from skilled physical therapy to safely progress mobility within the home, gait training on stairs for eventual return home, and to maximize independence. Patient in agreement with POC 2w1 1w4

## 2024-03-19 ENCOUNTER — HOME CARE VISIT (OUTPATIENT)
Dept: HOME HEALTH SERVICES | Facility: HOME HEALTH | Age: 59
End: 2024-03-19
Payer: MEDICARE

## 2024-03-19 VITALS — DIASTOLIC BLOOD PRESSURE: 60 MMHG | SYSTOLIC BLOOD PRESSURE: 140 MMHG | HEART RATE: 76 BPM | OXYGEN SATURATION: 98 %

## 2024-03-19 PROCEDURE — G0152 HHCP-SERV OF OT,EA 15 MIN: HCPCS | Mod: HHH

## 2024-03-19 PROCEDURE — 1090000001 HH PPS REVENUE CREDIT

## 2024-03-19 PROCEDURE — 1090000002 HH PPS REVENUE DEBIT

## 2024-03-19 ASSESSMENT — ENCOUNTER SYMPTOMS
HIGHEST PAIN SEVERITY IN PAST 24 HOURS: 4/10
PAIN LOCATION: RIGHT LEG
PAIN LOCATION - EXACERBATING FACTORS: MOVEMENT
PAIN LOCATION - PAIN FREQUENCY: CONSTANT
LOWEST PAIN SEVERITY IN PAST 24 HOURS: 2/10
PAIN LOCATION - PAIN SEVERITY: 2/10
PAIN: 1
PERSON REPORTING PAIN: PATIENT

## 2024-03-19 ASSESSMENT — ACTIVITIES OF DAILY LIVING (ADL)
DRESSING_LB_CURRENT_FUNCTION: STAND BY ASSIST
BATHING_CURRENT_FUNCTION: STAND BY ASSIST
BATHING ASSESSED: 1
TOILETING: STAND BY ASSIST
TOILETING: 1
DRESSING_UB_CURRENT_FUNCTION: SUPERVISION

## 2024-03-19 NOTE — HOME HEALTH
Primary Reason for Home Care: Patient referred for OHH services following hospitalization with fall and R ankle trimalleolar fx with reduction preformed and NWB of R LE. PMH: ashma, anxiety, HLD, cervical raiculopathy, former smoker. Patient lives alone in mobile home and currently staying in son's home with steps to access kitchen. Patient sponge bathing within 1st floor full bathroom and currently at SBA to min A for basic ADLs/transfers.  Skilled Needs: Patient to further benefit from additional OT services to further address safe task completion and fall prevention   Instruction provided: Transfer training with in 1st floor bathroom and fall prevention techniques.   PATIENT/CAREGIVER response to instruction: Patient receptive to all instruction and AE safety.   PATIENT/CAREGIVER: instructed on plan of care and visit frequency with PATIENT/CAREGIVER in agreement with plan of care and visit frequency.    Discipline Communication: OT frequency unil greater WB obtained.   Plan for next visit: functional task completion and fall prevention following possible cast placement next week after orto appt.

## 2024-03-20 PROCEDURE — 1090000001 HH PPS REVENUE CREDIT

## 2024-03-20 PROCEDURE — 1090000002 HH PPS REVENUE DEBIT

## 2024-03-21 ENCOUNTER — HOME CARE VISIT (OUTPATIENT)
Dept: HOME HEALTH SERVICES | Facility: HOME HEALTH | Age: 59
End: 2024-03-21
Payer: MEDICARE

## 2024-03-21 PROCEDURE — 1090000002 HH PPS REVENUE DEBIT

## 2024-03-21 PROCEDURE — 1090000001 HH PPS REVENUE CREDIT

## 2024-03-22 PROCEDURE — 1090000001 HH PPS REVENUE CREDIT

## 2024-03-22 PROCEDURE — 1090000002 HH PPS REVENUE DEBIT

## 2024-03-23 PROCEDURE — 1090000001 HH PPS REVENUE CREDIT

## 2024-03-23 PROCEDURE — 1090000002 HH PPS REVENUE DEBIT

## 2024-03-24 PROCEDURE — 1090000001 HH PPS REVENUE CREDIT

## 2024-03-24 PROCEDURE — 1090000002 HH PPS REVENUE DEBIT

## 2024-03-25 PROCEDURE — 1090000002 HH PPS REVENUE DEBIT

## 2024-03-25 PROCEDURE — 1090000001 HH PPS REVENUE CREDIT

## 2024-03-26 PROCEDURE — 1090000001 HH PPS REVENUE CREDIT

## 2024-03-26 PROCEDURE — 1090000002 HH PPS REVENUE DEBIT

## 2024-03-27 ENCOUNTER — HOSPITAL ENCOUNTER (OUTPATIENT)
Dept: RADIOLOGY | Facility: CLINIC | Age: 59
Discharge: HOME | End: 2024-03-27
Payer: MEDICARE

## 2024-03-27 ENCOUNTER — TELEPHONE (OUTPATIENT)
Dept: ORTHOPEDIC SURGERY | Facility: CLINIC | Age: 59
End: 2024-03-27

## 2024-03-27 ENCOUNTER — OFFICE VISIT (OUTPATIENT)
Dept: ORTHOPEDIC SURGERY | Facility: CLINIC | Age: 59
End: 2024-03-27
Payer: MEDICARE

## 2024-03-27 DIAGNOSIS — S82.851A TRIMALLEOLAR FRACTURE OF ANKLE, CLOSED, RIGHT, INITIAL ENCOUNTER: Primary | ICD-10-CM

## 2024-03-27 DIAGNOSIS — S82.891A CLOSED FRACTURE OF RIGHT ANKLE, INITIAL ENCOUNTER: ICD-10-CM

## 2024-03-27 DIAGNOSIS — S82.851A TRIMALLEOLAR FRACTURE OF ANKLE, CLOSED, RIGHT, INITIAL ENCOUNTER: ICD-10-CM

## 2024-03-27 PROCEDURE — 1090000002 HH PPS REVENUE DEBIT

## 2024-03-27 PROCEDURE — 73610 X-RAY EXAM OF ANKLE: CPT | Mod: RT

## 2024-03-27 PROCEDURE — 99024 POSTOP FOLLOW-UP VISIT: CPT | Performed by: ORTHOPAEDIC SURGERY

## 2024-03-27 PROCEDURE — 1090000001 HH PPS REVENUE CREDIT

## 2024-03-27 PROCEDURE — 73610 X-RAY EXAM OF ANKLE: CPT | Mod: RIGHT SIDE | Performed by: ORTHOPAEDIC SURGERY

## 2024-03-27 RX ORDER — CYCLOBENZAPRINE HCL 5 MG
5 TABLET ORAL NIGHTLY
Qty: 30 TABLET | Refills: 0 | Status: SHIPPED | OUTPATIENT
Start: 2024-03-27 | End: 2024-04-26

## 2024-03-27 NOTE — PROGRESS NOTES
Subjective    Patient ID: Gita    Chief Complaint:   Chief Complaint   Patient presents with    Right Ankle - Post-op Visit     ORIF 3/11/24       History of present illness    58-year-old female presented to clinic today for her first postop follow-up visit status post ORIF right ankle performed 3/11/2024.  She is nonweightbearing right lower extremity at this time.  She states that she is no longer taking her pain medication but taking her muscle relaxant which does help her sleep a little bit at night.  She denies numbness tingling at this time.  States her pain is minimal.  She admits to wiggling her toes I  reassured her that this was okay.  She is starting to get home physical therapy.  She presents with a posterior splint that was placed intraoperative.      Past medical , Surgical, Family and social history reviewed.      Physical exam  General: No acute distress and breathing comfortably.  Patient is pleasant and cooperative with the examination.    Extremity  Right ankle is neurovascular intact.  Good motion of her toes.  Mild edema of the right foot.  No sign of DVT.  Incisions are well-healed well-approximated over the medial and lateral aspect of the right ankle.  Staples remain in place.  Compartments soft.  No drainage.  No fracture blistering noted.  Mild ecchymosis posteriorly at this time.  No deformity or instability.    Diagnostics  Please see dictated x-ray report today    Procedure  Staples were removed Steri-Strips placed without complication  She was placed into a short leg cast nonweightbearing right lower extremity    Assessment  Status post ORIF right ankle    Treatment plan  1.  Cast care instructions were discussed with patient today.  2.  She was instructed to continue with nonweightbearing activity.  Continue with in-home rehab.  Prescription refill cyclobenzaprine sent to the pharmacy.  3.  We will go ahead and pre-CERT for a tall boot for her next appointment.  She will  follow-up with us in approximately 3 weeks for reevaluation with new x-rays out of the cast 3 views of the right ankle.  We may also begin physical therapy consisting of gentle range of motion at that time.  Will most likely begin weightbearing activity.  4.  All of the patient's questions were answered.    Orders Placed This Encounter    XR ankle right 3+ views       This note was prepared using voice recognition software.  The details of this note are correct and have been reviewed, and corrected to the best of my ability.  Some grammatical areas may persist related to the Dragon software    Catrachito Pineda PA-C, [facility]  [facility]  Orthopedic [place]    [phone]

## 2024-03-28 ENCOUNTER — HOME CARE VISIT (OUTPATIENT)
Dept: HOME HEALTH SERVICES | Facility: HOME HEALTH | Age: 59
End: 2024-03-28
Payer: MEDICARE

## 2024-03-28 PROCEDURE — 1090000002 HH PPS REVENUE DEBIT

## 2024-03-28 PROCEDURE — 1090000001 HH PPS REVENUE CREDIT

## 2024-03-29 ENCOUNTER — HOME CARE VISIT (OUTPATIENT)
Dept: HOME HEALTH SERVICES | Facility: HOME HEALTH | Age: 59
End: 2024-03-29
Payer: MEDICARE

## 2024-03-29 DIAGNOSIS — S82.851A TRIMALLEOLAR FRACTURE OF ANKLE, CLOSED, RIGHT, INITIAL ENCOUNTER: Primary | ICD-10-CM

## 2024-03-29 PROCEDURE — 1090000002 HH PPS REVENUE DEBIT

## 2024-03-29 PROCEDURE — 1090000001 HH PPS REVENUE CREDIT

## 2024-03-29 RX ORDER — TRAMADOL HYDROCHLORIDE 50 MG/1
50 TABLET ORAL EVERY 6 HOURS PRN
Qty: 28 TABLET | Refills: 0 | Status: SHIPPED | OUTPATIENT
Start: 2024-03-29 | End: 2024-04-05

## 2024-03-29 NOTE — TELEPHONE ENCOUNTER
Patient messaged in the Powered portal asking for something for pain that wasn't as strong as oxycodone. Tramadol will be sent to pharmacy which was verified with patient.

## 2024-03-30 PROCEDURE — 1090000001 HH PPS REVENUE CREDIT

## 2024-03-30 PROCEDURE — 1090000002 HH PPS REVENUE DEBIT

## 2024-03-31 PROCEDURE — 1090000001 HH PPS REVENUE CREDIT

## 2024-03-31 PROCEDURE — 1090000002 HH PPS REVENUE DEBIT

## 2024-04-01 PROCEDURE — 1090000001 HH PPS REVENUE CREDIT

## 2024-04-01 PROCEDURE — 1090000002 HH PPS REVENUE DEBIT

## 2024-04-02 ENCOUNTER — HOME CARE VISIT (OUTPATIENT)
Dept: HOME HEALTH SERVICES | Facility: HOME HEALTH | Age: 59
End: 2024-04-02
Payer: MEDICARE

## 2024-04-02 PROCEDURE — 1090000001 HH PPS REVENUE CREDIT

## 2024-04-02 PROCEDURE — 1090000002 HH PPS REVENUE DEBIT

## 2024-04-03 PROCEDURE — 1090000002 HH PPS REVENUE DEBIT

## 2024-04-03 PROCEDURE — 1090000001 HH PPS REVENUE CREDIT

## 2024-04-04 PROCEDURE — 1090000002 HH PPS REVENUE DEBIT

## 2024-04-04 PROCEDURE — 1090000001 HH PPS REVENUE CREDIT

## 2024-04-05 PROCEDURE — 1090000001 HH PPS REVENUE CREDIT

## 2024-04-05 PROCEDURE — 1090000002 HH PPS REVENUE DEBIT

## 2024-04-06 PROCEDURE — 1090000002 HH PPS REVENUE DEBIT

## 2024-04-06 PROCEDURE — 1090000001 HH PPS REVENUE CREDIT

## 2024-04-07 PROCEDURE — 1090000002 HH PPS REVENUE DEBIT

## 2024-04-07 PROCEDURE — 1090000001 HH PPS REVENUE CREDIT

## 2024-04-08 PROCEDURE — 1090000002 HH PPS REVENUE DEBIT

## 2024-04-08 PROCEDURE — 1090000001 HH PPS REVENUE CREDIT

## 2024-04-09 ENCOUNTER — HOME CARE VISIT (OUTPATIENT)
Dept: HOME HEALTH SERVICES | Facility: HOME HEALTH | Age: 59
End: 2024-04-09
Payer: MEDICARE

## 2024-04-09 PROCEDURE — 1090000001 HH PPS REVENUE CREDIT

## 2024-04-09 PROCEDURE — G0151 HHCP-SERV OF PT,EA 15 MIN: HCPCS | Mod: HHH

## 2024-04-09 PROCEDURE — 1090000002 HH PPS REVENUE DEBIT

## 2024-04-09 ASSESSMENT — ENCOUNTER SYMPTOMS
LOWEST PAIN SEVERITY IN PAST 24 HOURS: 0/10
SUBJECTIVE PAIN PROGRESSION: GRADUALLY IMPROVING
PAIN LOCATION: RIGHT ANKLE
PERSON REPORTING PAIN: PATIENT
PAIN: 1

## 2024-04-10 PROCEDURE — 1090000001 HH PPS REVENUE CREDIT

## 2024-04-10 PROCEDURE — 1090000002 HH PPS REVENUE DEBIT

## 2024-04-11 PROCEDURE — 1090000001 HH PPS REVENUE CREDIT

## 2024-04-11 PROCEDURE — 1090000002 HH PPS REVENUE DEBIT

## 2024-04-12 PROCEDURE — 1090000002 HH PPS REVENUE DEBIT

## 2024-04-12 PROCEDURE — 1090000001 HH PPS REVENUE CREDIT

## 2024-04-13 PROCEDURE — 1090000002 HH PPS REVENUE DEBIT

## 2024-04-13 PROCEDURE — 1090000001 HH PPS REVENUE CREDIT

## 2024-04-14 PROCEDURE — 1090000001 HH PPS REVENUE CREDIT

## 2024-04-14 PROCEDURE — 1090000002 HH PPS REVENUE DEBIT

## 2024-04-15 PROCEDURE — 1090000001 HH PPS REVENUE CREDIT

## 2024-04-15 PROCEDURE — 1090000002 HH PPS REVENUE DEBIT

## 2024-04-16 PROCEDURE — 1090000001 HH PPS REVENUE CREDIT

## 2024-04-16 PROCEDURE — 1090000003 HH PPS REVENUE ADJ

## 2024-04-16 PROCEDURE — 1090000002 HH PPS REVENUE DEBIT

## 2024-04-17 ENCOUNTER — HOSPITAL ENCOUNTER (OUTPATIENT)
Dept: RADIOLOGY | Facility: CLINIC | Age: 59
Discharge: HOME | End: 2024-04-17
Payer: MEDICARE

## 2024-04-17 ENCOUNTER — OFFICE VISIT (OUTPATIENT)
Dept: ORTHOPEDIC SURGERY | Facility: CLINIC | Age: 59
End: 2024-04-17
Payer: MEDICARE

## 2024-04-17 DIAGNOSIS — S82.851A TRIMALLEOLAR FRACTURE OF ANKLE, CLOSED, RIGHT, INITIAL ENCOUNTER: Primary | ICD-10-CM

## 2024-04-17 DIAGNOSIS — S82.851A TRIMALLEOLAR FRACTURE OF ANKLE, CLOSED, RIGHT, INITIAL ENCOUNTER: ICD-10-CM

## 2024-04-17 PROCEDURE — 73610 X-RAY EXAM OF ANKLE: CPT | Mod: RIGHT SIDE | Performed by: ORTHOPAEDIC SURGERY

## 2024-04-17 PROCEDURE — 73610 X-RAY EXAM OF ANKLE: CPT | Mod: RT

## 2024-04-17 PROCEDURE — 1090000001 HH PPS REVENUE CREDIT

## 2024-04-17 PROCEDURE — 1090000002 HH PPS REVENUE DEBIT

## 2024-04-17 PROCEDURE — L4361 PNEUMA/VAC WALK BOOT PRE OTS: HCPCS | Performed by: ORTHOPAEDIC SURGERY

## 2024-04-17 PROCEDURE — 99024 POSTOP FOLLOW-UP VISIT: CPT | Performed by: ORTHOPAEDIC SURGERY

## 2024-04-17 PROCEDURE — 1036F TOBACCO NON-USER: CPT | Performed by: ORTHOPAEDIC SURGERY

## 2024-04-17 NOTE — PROGRESS NOTES
History of present illness    58-year-old female follow-up open reduction internal fixation right ankle fracture surgery was March 11 she states doing well she has no complaints at this time she is been nonweightbearing in a short leg cast      Past medical , Surgical, Family and social history reviewed.      Physical exam  General: No acute distress and breathing comfortably.  Patient is pleasant and cooperative with the examination.    Extremity  Cast is removed today wounds are clean and dry there are no signs infection compartments are soft she got some swelling in her foot Homans' sign is negative neurologically intact distally brisk cap refill no redness or drainage    Diagnostics      XR ankle right 3+ views    Result Date: 4/17/2024  Interpreted By:  Layton Lechuga, STUDY: XR ANKLE RIGHT 3+ VIEWS; 4/17/2024 10:19 am   INDICATION: Signs/Symptoms:pain.   ACCESSION NUMBER(S): [id number]   ORDERING CLINICIAN: LAYTON LECHUGA   FINDINGS: Right ankle three views. Status post open reduction internal fixation by fany wear in good position without displacement mortise is intact fracture alignment well maintained.     Signed by: Layton Lechuga 4/17/2024 10:22 AM Dictation workstation:   [id number]    XR ankle right 3+ views    Result Date: 3/27/2024  Interpreted By:  Layton Lechuga, STUDY: XR ANKLE RIGHT 3+ VIEWS; 3/27/2024 1:47 pm   INDICATION: Signs/Symptoms:s/p ORIF.   ACCESSION NUMBER(S): [id number]   ORDERING CLINICIAN: LAYTON LECHUGA   FINDINGS: Right ankle three views. Status post open reduction internal fixation trimalleolar ankle fracture hardware in good position fracture fragments well aligned without displacement mortise is intact staples in the soft tissue no other bony abnormality     Signed by: Layton Lechuga 3/27/2024 3:00 PM Dictation workstation:   [id number]       Procedure  [ none]    Assessment  Status post open reduction internal fixation ankle  fracture  Treatment plan  1.  Walking boot applied today.  She can begin to weight-bear as tolerated in the boot.  She is to start working on gentle range of motion out of the boot she can remove the boot for sleeping and showering she is to follow-up in 1 month with x-ray likely will start physical therapy at that time.  2. [   ]  3. [   ]  4.  All of the patient's questions were answered.    Orders Placed This Encounter    XR ankle right 3+ views       This note was prepared using voice recognition software.  The details of this note are correct and have been reviewed, and corrected to the best of my ability.  Some grammatical areas may persist related to the Dragon software    Matteo Lechuga MD  Senior Attending Physician  [facility]  Orthopedic [place]    [phone]

## 2024-04-18 ENCOUNTER — HOME CARE VISIT (OUTPATIENT)
Dept: HOME HEALTH SERVICES | Facility: HOME HEALTH | Age: 59
End: 2024-04-18
Payer: MEDICARE

## 2024-04-18 VITALS — DIASTOLIC BLOOD PRESSURE: 70 MMHG | TEMPERATURE: 97.3 F | SYSTOLIC BLOOD PRESSURE: 115 MMHG | HEART RATE: 85 BPM

## 2024-04-18 PROCEDURE — 1090000002 HH PPS REVENUE DEBIT

## 2024-04-18 PROCEDURE — 1090000001 HH PPS REVENUE CREDIT

## 2024-04-18 PROCEDURE — 0023 HH SOC

## 2024-04-18 PROCEDURE — G0151 HHCP-SERV OF PT,EA 15 MIN: HCPCS | Mod: HHH

## 2024-04-18 SDOH — HEALTH STABILITY: PHYSICAL HEALTH
EXERCISE COMMENTS: INSTR IN GENTLE DF/PF IN PAIN FREE ROM, INSTR TO DO 10-20 REPS TO TOLERANCE 2-3  TIMES DAILY , STAY IN PAIN FREE ROM, PT ABLE TO PERFORM WITHOUT PAIN ON THIS DATE, INSTR PT TO NOT PUSH THROUGH ROM

## 2024-04-18 ASSESSMENT — ACTIVITIES OF DAILY LIVING (ADL): AMBULATION_DISTANCE/DURATION_TOLERATED: 15 FT

## 2024-04-18 ASSESSMENT — ENCOUNTER SYMPTOMS
PERSON REPORTING PAIN: PATIENT
HIGHEST PAIN SEVERITY IN PAST 24 HOURS: 6/10
PAIN LOCATION: RIGHT FOOT
PAIN LOCATION - PAIN SEVERITY: 4/10
PAIN: 1

## 2024-04-19 PROCEDURE — 1090000001 HH PPS REVENUE CREDIT

## 2024-04-19 PROCEDURE — 1090000002 HH PPS REVENUE DEBIT

## 2024-04-20 PROCEDURE — 1090000001 HH PPS REVENUE CREDIT

## 2024-04-20 PROCEDURE — 1090000002 HH PPS REVENUE DEBIT

## 2024-04-21 PROCEDURE — 1090000002 HH PPS REVENUE DEBIT

## 2024-04-21 PROCEDURE — 1090000001 HH PPS REVENUE CREDIT

## 2024-04-22 PROCEDURE — 1090000001 HH PPS REVENUE CREDIT

## 2024-04-22 PROCEDURE — 1090000002 HH PPS REVENUE DEBIT

## 2024-04-22 NOTE — DISCHARGE SUMMARY
Discharge Diagnosis  Trimalleolar fracture of ankle, closed, right, initial encounter    Issues Requiring Follow-Up   follow-up with orthopedics, PT OT    Discharge Meds     Your medication list        START taking these medications        Instructions Last Dose Given Next Dose Due   aspirin 81 mg EC tablet      Take 1 tablet (81 mg) by mouth 2 times a day.       cyclobenzaprine 5 mg tablet  Commonly known as: Flexeril      Take 1 tablet (5 mg) by mouth 3 times a day for 7 days.       docusate sodium 100 mg capsule  Commonly known as: Colace      Take 1 capsule (100 mg) by mouth 2 times a day for 10 days.       metoclopramide 10 mg tablet  Commonly known as: Reglan      Take 1 tablet (10 mg) by mouth 4 times a day as needed (nausea).       oxyCODONE 5 mg immediate release tablet  Commonly known as: Roxicodone      Take 1 tablet (5 mg) by mouth every 6 hours if needed for moderate pain (4 - 6) or severe pain (7 - 10) for up to 7 days.       Pain Reliever (acetaminophen) 500 mg tablet  Generic drug: acetaminophen      Take 2 tablets (1,000 mg) by mouth every 8 hours if needed for mild pain (1 - 3) or moderate pain (4 - 6) for up to 14 days.              CONTINUE taking these medications        Instructions Last Dose Given Next Dose Due   ALPRAZolam 0.5 mg tablet  Commonly known as: Xanax           atorvastatin 80 mg tablet  Commonly known as: Lipitor           metoprolol succinate XL 25 mg 24 hr tablet  Commonly known as: Toprol-XL           omeprazole 40 mg DR capsule  Commonly known as: PriLOSEC           Proventil HFA 90 mcg/actuation inhaler  Generic drug: albuterol           SUMAtriptan 50 mg tablet  Commonly known as: Imitrex           Symbicort 160-4.5 mcg/actuation inhaler  Generic drug: budesonide-formoteroL                     Where to Get Your Medications        These medications were sent to [facility]  [address] [address]      Hours: 9 AM to 5:30 PM Mon-Fri, 9 AM  to 1 PM Saturday Phone: [phone]   aspirin 81 mg EC tablet  cyclobenzaprine 5 mg tablet  docusate sodium 100 mg capsule  metoclopramide 10 mg tablet  oxyCODONE 5 mg immediate release tablet  Pain Reliever (acetaminophen) 500 mg tablet         Test Results Pending At Discharge  Pending Labs       No current pending labs.            Hospital Course    58-year-old man with medical history significant for asthma, hyperlipidemia, ALBERT, obesity and right cervical radiculopathy who presented status post mechanical fall with ankle pain and swelling.  Patient was found to have a trimalleolar fracture.  orthopedics was consulted and ankle was immobilized with focus on pain control.  Orthopedics performed  ORIF of the right ankle which patient tolerated well.  Patient worked with PT OT and was discharged home with home care.    > 30 minutes was spent on discharge services.      Pertinent Physical Exam At Time of Discharge  Physical Exam    GENERAL: Laying in bed, does not appear to be in any distress.   HEENT: HEAD: Normocephalic atraumatic.  Neck: Supple.  Eyes: Pupils are reactive to direct light.   CVS: S1, S2 heard. Regular rate and rhythm  LUNGS: Clear to auscultate bilaterally. No wheezing or rhonchi appreciated.  ABDOMEN: Soft, nontender to palpate. Positive bowel sounds. No guarding or rebound appreciated.  NEUROLOGICAL: No focal neurological deficits appreciated. Cranial nerves are grossly intact.  EXTREMITIES: No edema appreciated.  Right lower extremity is concealed with dressing.  SKIN:  Grossly intact, warm and dry.      Outpatient Follow-Up  Future Appointments   Date Time Provider Department Center   4/23/2024 To Be Determined Susie Davis PT [facility]   5/1/2024 To Be Determined Susie Davis PT [facility]   5/8/2024 10:15 AM Matteo Lechuga MD [id number] [place]   5/9/2024 To Be Determined Susie Davis PT [facility]         Herminia Sr MD

## 2024-04-23 PROCEDURE — 1090000001 HH PPS REVENUE CREDIT

## 2024-04-23 PROCEDURE — 1090000002 HH PPS REVENUE DEBIT

## 2024-04-24 ENCOUNTER — HOME CARE VISIT (OUTPATIENT)
Dept: HOME HEALTH SERVICES | Facility: HOME HEALTH | Age: 59
End: 2024-04-24
Payer: MEDICARE

## 2024-04-24 VITALS — SYSTOLIC BLOOD PRESSURE: 140 MMHG | DIASTOLIC BLOOD PRESSURE: 90 MMHG | TEMPERATURE: 97.8 F | HEART RATE: 74 BPM

## 2024-04-24 PROCEDURE — 1090000002 HH PPS REVENUE DEBIT

## 2024-04-24 PROCEDURE — 1090000001 HH PPS REVENUE CREDIT

## 2024-04-24 PROCEDURE — G0151 HHCP-SERV OF PT,EA 15 MIN: HCPCS | Mod: HHH

## 2024-04-24 SDOH — HEALTH STABILITY: PHYSICAL HEALTH: EXERCISE COMMENTS: ASSESSED CARRYOVER OF HEP, INSTR TO WEAR BOOT MORE OFTEN DURING THE DAY TO PRESERVE ROM

## 2024-04-24 ASSESSMENT — ENCOUNTER SYMPTOMS
PAIN: 1
PERSON REPORTING PAIN: PATIENT
PAIN LOCATION - PAIN SEVERITY: 4/10
PAIN LOCATION: RIGHT FOOT
HIGHEST PAIN SEVERITY IN PAST 24 HOURS: 6/10

## 2024-04-24 ASSESSMENT — ACTIVITIES OF DAILY LIVING (ADL)
AMBULATION_DISTANCE/DURATION_TOLERATED: 30 FT
AMBULATION ASSISTANCE ON FLAT SURFACES: 1

## 2024-04-25 PROCEDURE — 1090000001 HH PPS REVENUE CREDIT

## 2024-04-25 PROCEDURE — 1090000002 HH PPS REVENUE DEBIT

## 2024-04-26 PROCEDURE — 1090000002 HH PPS REVENUE DEBIT

## 2024-04-26 PROCEDURE — 1090000001 HH PPS REVENUE CREDIT

## 2024-04-27 PROCEDURE — 1090000001 HH PPS REVENUE CREDIT

## 2024-04-27 PROCEDURE — 1090000002 HH PPS REVENUE DEBIT

## 2024-04-28 PROCEDURE — 1090000002 HH PPS REVENUE DEBIT

## 2024-04-28 PROCEDURE — 1090000001 HH PPS REVENUE CREDIT

## 2024-04-29 ENCOUNTER — TELEPHONE (OUTPATIENT)
Dept: ORTHOPEDIC SURGERY | Facility: CLINIC | Age: 59
End: 2024-04-29
Payer: MEDICARE

## 2024-04-29 DIAGNOSIS — S82.851A TRIMALLEOLAR FRACTURE OF ANKLE, CLOSED, RIGHT, INITIAL ENCOUNTER: ICD-10-CM

## 2024-04-29 PROCEDURE — 1090000001 HH PPS REVENUE CREDIT

## 2024-04-29 PROCEDURE — 1090000002 HH PPS REVENUE DEBIT

## 2024-04-29 RX ORDER — CYCLOBENZAPRINE HCL 10 MG
10 TABLET ORAL 3 TIMES DAILY PRN
Qty: 21 TABLET | Refills: 0 | Status: SHIPPED | OUTPATIENT
Start: 2024-04-29 | End: 2024-05-08 | Stop reason: SDUPTHER

## 2024-04-29 NOTE — TELEPHONE ENCOUNTER
Patient called asking for a refill on her muscle relaxer's, also asked if she is allowed to take 2 of them at a time instead. Pharmacy is [facility] in [place]

## 2024-04-29 NOTE — TELEPHONE ENCOUNTER
Called and spoke with patient, she states she was given 5 mg of Flexeril, she was advised she may take 2 tablets instead of one.  We will send her a refill of the flexeril, increasing her from 5 mg to 10 mg and discussed that she will only need to take ONE of the 10 mg flexeril instead of TWO of the 5 mg.  Patient understood and medication will be sent to pharmacy.

## 2024-04-30 PROCEDURE — 1090000001 HH PPS REVENUE CREDIT

## 2024-04-30 PROCEDURE — 1090000002 HH PPS REVENUE DEBIT

## 2024-05-01 ENCOUNTER — HOME CARE VISIT (OUTPATIENT)
Dept: HOME HEALTH SERVICES | Facility: HOME HEALTH | Age: 59
End: 2024-05-01
Payer: MEDICARE

## 2024-05-01 VITALS — TEMPERATURE: 98.2 F | SYSTOLIC BLOOD PRESSURE: 136 MMHG | DIASTOLIC BLOOD PRESSURE: 80 MMHG | HEART RATE: 83 BPM

## 2024-05-01 PROCEDURE — G0151 HHCP-SERV OF PT,EA 15 MIN: HCPCS | Mod: HHH

## 2024-05-01 PROCEDURE — 1090000001 HH PPS REVENUE CREDIT

## 2024-05-01 PROCEDURE — 1090000002 HH PPS REVENUE DEBIT

## 2024-05-01 SDOH — HEALTH STABILITY: PHYSICAL HEALTH: EXERCISE COMMENTS: ASSESSED CARRYOVER OF HEP, ADDED GENTLE ANKLE CIRCLES CW/CCW SUPINE WITHIN PAIN FREE RANGE

## 2024-05-01 ASSESSMENT — ENCOUNTER SYMPTOMS
HIGHEST PAIN SEVERITY IN PAST 24 HOURS: 8/10
PAIN: 1
PAIN LOCATION - PAIN SEVERITY: 0/10
PAIN LOCATION: RIGHT FOOT

## 2024-05-02 PROCEDURE — 1090000002 HH PPS REVENUE DEBIT

## 2024-05-02 PROCEDURE — 1090000001 HH PPS REVENUE CREDIT

## 2024-05-03 PROCEDURE — 1090000002 HH PPS REVENUE DEBIT

## 2024-05-03 PROCEDURE — 1090000001 HH PPS REVENUE CREDIT

## 2024-05-04 PROCEDURE — 1090000001 HH PPS REVENUE CREDIT

## 2024-05-04 PROCEDURE — 1090000002 HH PPS REVENUE DEBIT

## 2024-05-05 PROCEDURE — 1090000002 HH PPS REVENUE DEBIT

## 2024-05-05 PROCEDURE — 1090000001 HH PPS REVENUE CREDIT

## 2024-05-06 PROCEDURE — 1090000002 HH PPS REVENUE DEBIT

## 2024-05-06 PROCEDURE — 1090000001 HH PPS REVENUE CREDIT

## 2024-05-07 PROCEDURE — 1090000001 HH PPS REVENUE CREDIT

## 2024-05-07 PROCEDURE — 1090000002 HH PPS REVENUE DEBIT

## 2024-05-08 ENCOUNTER — HOSPITAL ENCOUNTER (OUTPATIENT)
Dept: RADIOLOGY | Facility: CLINIC | Age: 59
Discharge: HOME | End: 2024-05-08
Payer: COMMERCIAL

## 2024-05-08 ENCOUNTER — OFFICE VISIT (OUTPATIENT)
Dept: ORTHOPEDIC SURGERY | Facility: CLINIC | Age: 59
End: 2024-05-08
Payer: COMMERCIAL

## 2024-05-08 DIAGNOSIS — S82.851A TRIMALLEOLAR FRACTURE OF ANKLE, CLOSED, RIGHT, INITIAL ENCOUNTER: ICD-10-CM

## 2024-05-08 DIAGNOSIS — S82.851D TRIMALLEOLAR FRACTURE OF ANKLE, CLOSED, RIGHT, WITH ROUTINE HEALING, SUBSEQUENT ENCOUNTER: Primary | ICD-10-CM

## 2024-05-08 PROCEDURE — 99024 POSTOP FOLLOW-UP VISIT: CPT | Performed by: PHYSICIAN ASSISTANT

## 2024-05-08 PROCEDURE — 73610 X-RAY EXAM OF ANKLE: CPT | Mod: RIGHT SIDE | Performed by: ORTHOPAEDIC SURGERY

## 2024-05-08 PROCEDURE — 73610 X-RAY EXAM OF ANKLE: CPT | Mod: RT

## 2024-05-08 PROCEDURE — 1090000001 HH PPS REVENUE CREDIT

## 2024-05-08 PROCEDURE — 1090000002 HH PPS REVENUE DEBIT

## 2024-05-08 RX ORDER — CYCLOBENZAPRINE HCL 10 MG
10 TABLET ORAL 3 TIMES DAILY PRN
Qty: 21 TABLET | Refills: 0 | Status: SHIPPED | OUTPATIENT
Start: 2024-05-08 | End: 2024-05-15 | Stop reason: SDUPTHER

## 2024-05-09 ENCOUNTER — HOME CARE VISIT (OUTPATIENT)
Dept: HOME HEALTH SERVICES | Facility: HOME HEALTH | Age: 59
End: 2024-05-09
Payer: MEDICARE

## 2024-05-09 PROCEDURE — 1090000002 HH PPS REVENUE DEBIT

## 2024-05-09 PROCEDURE — 1090000001 HH PPS REVENUE CREDIT

## 2024-05-09 NOTE — PROGRESS NOTES
Subjective    Patient ID: Gita    Chief Complaint:   Chief Complaint   Patient presents with    Right Ankle - Follow-up     ORIF Right Ankle 3/11/24  Xrays today      History of present illness    58-year-old female presented to clinic today for her 2-month postop visit status post ORIF right ankle.  She presents today in a walking boot.  She is been weightbearing as tolerated right lower extremity over the last few weeks.  She states that she has not had much pain however she has weakness and a lack of confidence with weightbearing at this time.  She has quite a bit of stiffness present.  She states that the boot has not been fitting properly for her.  Been causing her some irritation.  She has not been taking much of anything except for the muscle relaxant which does seem to be helping at night.  No numbness tingling reported.      Past medical , Surgical, Family and social history reviewed.      Physical exam  General: No acute distress and breathing comfortably.  Patient is pleasant and cooperative with the examination.    Extremity  Right ankle is neurovascular intact.  No sign of infection.  Incisions are well-healed without abnormality.  No sign of DVT.  No calf tenderness.  Stiffness of the right ankle joint.  Mild discomfort over the plantar fascia/Achilles tendon insertion.  No deformity on exam.  No instability.    Diagnostics  [ none]  XR ankle right 3+ views    Result Date: 5/8/2024  Interpreted By:  Layton Lechuga, STUDY: XR ANKLE RIGHT 3+ VIEWS; 5/8/2024 10:57 am   INDICATION: Signs/Symptoms:pain.   ACCESSION NUMBER(S): [id number]   ORDERING CLINICIAN: LAYTON LECHUGA   FINDINGS: Right ankle three views. Status post open reduction internal fixation by malleolar ankle fracture hardware in good position fracture well reduced mortise intact no other bony abnormality     Signed by: Layton Lechuga 5/8/2024 11:57 AM Dictation workstation:   [id number]    XR ankle right 3+ views    Result  Date: 4/17/2024  Interpreted By:  Layton Lechuga, STUDY: XR ANKLE RIGHT 3+ VIEWS; 4/17/2024 10:19 am   INDICATION: Signs/Symptoms:pain.   ACCESSION NUMBER(S): [id number]   ORDERING CLINICIAN: LAYTON LECHUGA   FINDINGS: Right ankle three views. Status post open reduction internal fixation by fany wear in good position without displacement mortise is intact fracture alignment well maintained.     Signed by: Layton Lechuga 4/17/2024 10:22 AM Dictation workstation:   [id number]       Procedure  [ none]    Assessment  Status post ORIF right ankle    Treatment plan  1.  At this time she was permitted to continue with weightbearing activity as tolerated in the boot only.  2.  She will also be allowed to come out of the boot for range of motion activities.  We will begin outpatient physical therapy working on strength as well as range of motion.  She has 2 more weeks left of in-home rehab.  Prescription refill cyclobenzaprine sent to the pharmacy.  3.  We we will begin the pre-cert process for her lace up ankle brace for her next appointment.  Follow-up with her in 1 month with new x-rays 3 views right ankle at that time out of the boot.  4.  All of the patient's questions were answered.    Orders Placed This Encounter    Ankle Brace, Lace Up or A60    XR ankle right 3+ views    Referral to Physical Therapy    cyclobenzaprine (Flexeril) 10 mg tablet       This note was prepared using voice recognition software.  The details of this note are correct and have been reviewed, and corrected to the best of my ability.  Some grammatical areas may persist related to the Dragon software    Catrachito Pineda PA-C, [facility]  Orthopedic [place]    [phone]

## 2024-05-10 PROCEDURE — 1090000002 HH PPS REVENUE DEBIT

## 2024-05-10 PROCEDURE — 1090000001 HH PPS REVENUE CREDIT

## 2024-05-11 PROCEDURE — 1090000001 HH PPS REVENUE CREDIT

## 2024-05-11 PROCEDURE — 1090000002 HH PPS REVENUE DEBIT

## 2024-05-12 PROCEDURE — 1090000002 HH PPS REVENUE DEBIT

## 2024-05-12 PROCEDURE — 1090000001 HH PPS REVENUE CREDIT

## 2024-05-13 PROCEDURE — 1090000001 HH PPS REVENUE CREDIT

## 2024-05-13 PROCEDURE — 1090000002 HH PPS REVENUE DEBIT

## 2024-05-14 ENCOUNTER — HOME CARE VISIT (OUTPATIENT)
Dept: HOME HEALTH SERVICES | Facility: HOME HEALTH | Age: 59
End: 2024-05-14
Payer: MEDICARE

## 2024-05-14 VITALS — HEART RATE: 102 BPM | SYSTOLIC BLOOD PRESSURE: 139 MMHG | TEMPERATURE: 98.1 F | DIASTOLIC BLOOD PRESSURE: 92 MMHG

## 2024-05-14 PROCEDURE — 1090000001 HH PPS REVENUE CREDIT

## 2024-05-14 PROCEDURE — G0151 HHCP-SERV OF PT,EA 15 MIN: HCPCS | Mod: HHH

## 2024-05-14 PROCEDURE — 1090000002 HH PPS REVENUE DEBIT

## 2024-05-14 SDOH — HEALTH STABILITY: PHYSICAL HEALTH: EXERCISE COMMENTS: ENDURANCE

## 2024-05-14 SDOH — HEALTH STABILITY: PHYSICAL HEALTH
EXERCISE COMMENTS: UPDATED HEP: ANKLE DF/PF, ADDED DF FROM NEUTRAL AND PF FROM NEUTRAL, ANKLE CIRCLES CW, CCW AND ANKLE ALPHABET A-Z  INSTR PT DO HEP AT LEAST 2-3 TIMES A DAY IN ADDITION TO 4-6 SHORT WALKS USING WW WITH CAM BOOT IN PLACE THROUGHOUT THE DAY  TO IMPROVE

## 2024-05-14 ASSESSMENT — ENCOUNTER SYMPTOMS
PERSON REPORTING PAIN: PATIENT
PAIN: 1
OCCASIONAL FEELINGS OF UNSTEADINESS: 0
PAIN LOCATION: RIGHT FOOT
PAIN LOCATION - PAIN SEVERITY: 6/10

## 2024-05-14 ASSESSMENT — ACTIVITIES OF DAILY LIVING (ADL)
AMBULATION ASSISTANCE ON FLAT SURFACES: 1
AMBULATION_DISTANCE/DURATION_TOLERATED: 40 FT X 2

## 2024-05-15 DIAGNOSIS — S82.851A TRIMALLEOLAR FRACTURE OF ANKLE, CLOSED, RIGHT, INITIAL ENCOUNTER: ICD-10-CM

## 2024-05-15 PROCEDURE — 1090000001 HH PPS REVENUE CREDIT

## 2024-05-15 PROCEDURE — 1090000002 HH PPS REVENUE DEBIT

## 2024-05-15 RX ORDER — CYCLOBENZAPRINE HCL 10 MG
10 TABLET ORAL 3 TIMES DAILY PRN
Qty: 21 TABLET | Refills: 0 | Status: SHIPPED | OUTPATIENT
Start: 2024-05-15 | End: 2024-05-23 | Stop reason: SDUPTHER

## 2024-05-15 NOTE — TELEPHONE ENCOUNTER
Patient called and left voicemail asking for refill of muscle relaxer.  Refill will be sent to pharmacy

## 2024-05-16 ENCOUNTER — HOME CARE VISIT (OUTPATIENT)
Dept: HOME HEALTH SERVICES | Facility: HOME HEALTH | Age: 59
End: 2024-05-16
Payer: MEDICARE

## 2024-05-16 VITALS — SYSTOLIC BLOOD PRESSURE: 120 MMHG | HEART RATE: 96 BPM | TEMPERATURE: 97 F | DIASTOLIC BLOOD PRESSURE: 80 MMHG

## 2024-05-16 PROCEDURE — 1090000001 HH PPS REVENUE CREDIT

## 2024-05-16 PROCEDURE — 1090000002 HH PPS REVENUE DEBIT

## 2024-05-16 PROCEDURE — G0151 HHCP-SERV OF PT,EA 15 MIN: HCPCS | Mod: HHH

## 2024-05-16 SDOH — HEALTH STABILITY: PHYSICAL HEALTH: EXERCISE COMMENTS: INSTR IN SLR , AROM R ANKLE, ANKLE ALPHABET, CIRCLES, SEVERAL SHORT WALKS THROUGHOUT THE DAY

## 2024-05-16 ASSESSMENT — ENCOUNTER SYMPTOMS
PAIN LOCATION: RIGHT FOOT
LOSS OF SENSATION IN FEET: 1
OCCASIONAL FEELINGS OF UNSTEADINESS: 1
DEPRESSION: 0
PAIN LOCATION - PAIN SEVERITY: 3/10
PERSON REPORTING PAIN: PATIENT
PAIN: 1

## 2024-05-16 ASSESSMENT — ACTIVITIES OF DAILY LIVING (ADL)
OASIS_M1830: 04
HOME_HEALTH_OASIS: 00
AMBULATION ASSISTANCE ON FLAT SURFACES: 1
AMBULATION_DISTANCE/DURATION_TOLERATED: 150 FT

## 2024-05-23 DIAGNOSIS — S82.851A TRIMALLEOLAR FRACTURE OF ANKLE, CLOSED, RIGHT, INITIAL ENCOUNTER: ICD-10-CM

## 2024-05-23 RX ORDER — CYCLOBENZAPRINE HCL 10 MG
10 TABLET ORAL 3 TIMES DAILY PRN
Qty: 21 TABLET | Refills: 0 | Status: SHIPPED | OUTPATIENT
Start: 2024-05-23 | End: 2024-05-31 | Stop reason: SDUPTHER

## 2024-05-30 ENCOUNTER — TELEPHONE (OUTPATIENT)
Dept: ORTHOPEDIC SURGERY | Facility: CLINIC | Age: 59
End: 2024-05-30
Payer: MEDICARE

## 2024-05-30 DIAGNOSIS — S82.851A TRIMALLEOLAR FRACTURE OF ANKLE, CLOSED, RIGHT, INITIAL ENCOUNTER: ICD-10-CM

## 2024-05-31 RX ORDER — CYCLOBENZAPRINE HCL 10 MG
10 TABLET ORAL 3 TIMES DAILY PRN
Qty: 21 TABLET | Refills: 0 | Status: SHIPPED | OUTPATIENT
Start: 2024-05-31 | End: 2024-06-07

## 2024-05-31 NOTE — TELEPHONE ENCOUNTER
Patient called requesting a refill on her Flexeril .   She has an appointment 6/12/24 , she stated she has been seeing Catrachito and she is ok with that she has never actually met with Dr. Lechuga and she would like to , please have him make an appearance at her next follow up .

## 2024-06-11 NOTE — PROGRESS NOTES
Physical Therapy Evaluation    Patient Name: Gita Kincaid  MRN: [id number]                          *S82.851D (ICD-10-CM) - Trimalleolar fracture of ankle, closed, right, with routine healing, subsequent encounter // Gogo confirmed 6/5/24 10:13am  [employer] COP[employer] 0 DED 0 COVERAGE 100  OOP 0 AUTH REQ AFTER IE [id number]   Current Problem  No diagnosis found.  Insurance    Insurance reviewed   Visit number: 1  Approved number of visits: **  AUTH*      Subjective   General:  Patient is a 58 year old female presenting post closed trimalleolar fracture on 3/10 of R ankle with subsequent ORIF This was done by Dr. Matteo Lechuga  on 3/11. ** reports a history of *** pain for *** . Patient reports *** difficulty with ADL activities, including***.  Patient reports that sleep is ***. Patient denies any abnormal/calf pain, acute/abnormal lower leg swelling, shortness of breath or any overt signs of DVT/PE. Patient also denies any acute irritation of surgical site, no odorous/abnormally colored discharge or overt signs of infection. Patient reports *** will follow up with *** presiding surgeon on ***. The patient's goals for therapy are ***.     Precautions:  1.  At this time she was permitted to continue with weightbearing activity as tolerated in the boot only.  2.  She will also be allowed to come out of the boot for range of motion activities.  We will begin outpatient physical therapy working on strength as well as range of motion.  *Per follow up note with Catrachito Calvo PA-C on 5/8, follow up scheduled for 6/12  Pain:    Reviewed medical screening form with pt and medical screening assessed    Imaging:   Narrative & Impression   Interpreted By:  Matteo Lechuga,   STUDY:  XR ANKLE RIGHT 3+ VIEWS; 5/8/2024 10:57 am      INDICATION:  Signs/Symptoms:pain.      ACCESSION NUMBER(S):  [id number]      ORDERING CLINICIAN:  MATTEO LECHUGA      FINDINGS:  Right ankle three views. Status post open reduction internal  fixation  by malleolar ankle fracture hardware in good position fracture well  reduced mortise intact no other bony abnormality          Signed by: Matteo Lechuga 5/8/2024 11:57 AM  Dictation workstation:   [id number]     Objective   Observation:  Lower Quarter Screen  Myotomes:   Dermatomes:   Patellar Reflex:   Achilles Reflex:   Clonus:     Functional Movement  Gait:  SL balance: R-  seconds  L-  seconds  Step up: R-  L-   Step down: R-  L-    Sit to stand:  Squat:  SL heel raise to fatigue: R-  L-      Ankle PROM (*indicates pain)  DF R degrees ; L  degrees  PF R  degrees ; L  degrees  Inversion R  degrees ; L  degrees  Eversion R  degrees ; L  degrees  1st MTP Flexion R  degrees ; L  degrees  1st MTP Extension R  degrees; L  degrees    Ankle AROM (*indicates pain)  DF R  degrees  ; L  degrees  PF R  degrees; L  degrees  Inversion R   degrees; L  degrees  Eversion R  degrees; L  degrees  1st MTP Flexion R   degrees; L  degrees  1st MTP Extension R  degrees ; L  degrees    Ankle MMT (*indicates pain)  DF R   /5; L  /5  PF R  /5; L  /5  Inversion R  /5 ; L  /5  Eversion R   /5; L  /5  1st MTP Flexion R  /5; L  /5  1st MTP Extension R   /5; L  /5    Accessory Mobility:  Talocrural Joint: R- L-    Subtalar Joint:  R-  L-      Squeeze Test: R-  L-    Talar Tilt Test: R-  L-    Anterior Drawer Test: R-  L-    Henry Test:  R-  L-    Dorsalis Pedis Pulse: R-  L-      Palpation:   {Orthopedic Special Tests:79743}    Outcome Measures:  {PT Outcome Measures:78267}     Treatment:   -Patient education regarding recovery timeline, rehabilitation process and rehabilitation timeline, home exercise program demonstration and construction, review of precautions, and long term strategies for maximize functional capacity and functional independence    EDUCATION/HEP:    Assessment:  Patient is a  58 year old female presenting post closed trimalleolar fracture on 3/10 of R ankle with subsequent ORIF This was done by   Matteo Lechuga  on 3/11. Patient presents with the following primary physical and functional impairments and limitations:  ***.  Patient is displaying good prognosis for physical therapy care based upon subjective and objective assessment. Patient will benefit from skilled intervention to address the above mentioned impairment to maximize functional capacity and quality of life. Patient appeared to understand all education provided. Patient is displaying good prognosis for physical therapy care based upon subjective and objective assessment.       Physical Therapy Problem List  1.  2.   3.        Clinical Presentation: Stable   Level of Complexity: Low   Goals:  Pt will be independent with advanced HEP   Pt will display affected ankle strength to 5/5 in all major planes to improve stability with all functional mobility  Pt will demo full and symmetrical affected ankle PROM/AROM to normalize mechanics with all functional mobility  Pt will demo R LE SLS >/=10 sec without UE assist on compliant surface for functional carryover into dynamic balance  Pt will negotiate flight of stairs mod I reciprocally without increased ankle pain  Pt will ambulate community distance independent over varying surfaces/inclines without increased R ankle pain or instability over varying surfaces/inclines   Pt will increase LEFS score by at least 9 points (MCID) to indicate significant improvement in function.  Pt will report return to *** with minimal pain/limitation, indicating improved functional capacity.     Plan  x/week for  visits     Skilled therapeutic intervention to address the above mentioned physical and functional impairments and limitations including, but not limited to: patient education, therapeutic exercise, therapeutic activity, manual therapy, body mechanics training, dry needling, blood flow restriction training, instrumented soft tissue mobilization, manual soft tissue mobilization, gait retraining, biofeedback,  cryotherapy, electrical stimulation, home program development, hot pack, taping, neuromuscular re-education, self-care/home management, and vasopneumatic compression.

## 2024-06-12 ENCOUNTER — APPOINTMENT (OUTPATIENT)
Dept: ORTHOPEDIC SURGERY | Facility: CLINIC | Age: 59
End: 2024-06-12
Payer: MEDICARE

## 2024-06-12 ENCOUNTER — APPOINTMENT (OUTPATIENT)
Dept: PHYSICAL THERAPY | Facility: CLINIC | Age: 59
End: 2024-06-12
Payer: MEDICARE

## 2024-06-13 ENCOUNTER — TELEPHONE (OUTPATIENT)
Dept: ORTHOPEDIC SURGERY | Facility: CLINIC | Age: 59
End: 2024-06-13
Payer: MEDICARE

## 2024-06-14 ENCOUNTER — TELEPHONE (OUTPATIENT)
Dept: ORTHOPEDIC SURGERY | Facility: CLINIC | Age: 59
End: 2024-06-14
Payer: MEDICARE

## 2024-06-14 NOTE — TELEPHONE ENCOUNTER
Called and spoke with patient, because as she is 3 months out we will refer her to her primary care doctor for any additional refills for flexeril. Patient understood.

## 2024-06-19 ENCOUNTER — HOSPITAL ENCOUNTER (OUTPATIENT)
Dept: RADIOLOGY | Facility: CLINIC | Age: 59
Discharge: HOME | End: 2024-06-19
Payer: MEDICARE

## 2024-06-19 ENCOUNTER — OFFICE VISIT (OUTPATIENT)
Dept: ORTHOPEDIC SURGERY | Facility: CLINIC | Age: 59
End: 2024-06-19
Payer: MEDICARE

## 2024-06-19 DIAGNOSIS — S82.851A TRIMALLEOLAR FRACTURE OF ANKLE, CLOSED, RIGHT, INITIAL ENCOUNTER: Primary | ICD-10-CM

## 2024-06-19 DIAGNOSIS — S82.851A TRIMALLEOLAR FRACTURE OF ANKLE, CLOSED, RIGHT, INITIAL ENCOUNTER: ICD-10-CM

## 2024-06-19 PROCEDURE — 99213 OFFICE O/P EST LOW 20 MIN: CPT | Performed by: ORTHOPAEDIC SURGERY

## 2024-06-19 PROCEDURE — 73610 X-RAY EXAM OF ANKLE: CPT | Mod: RT

## 2024-06-19 PROCEDURE — L1902 AFO ANKLE GAUNTLET PRE OTS: HCPCS | Performed by: ORTHOPAEDIC SURGERY

## 2024-06-19 PROCEDURE — 73610 X-RAY EXAM OF ANKLE: CPT | Mod: RIGHT SIDE | Performed by: ORTHOPAEDIC SURGERY

## 2024-06-19 NOTE — PROGRESS NOTES
Subjective    Patient ID: Gita    Chief Complaint:   Chief Complaint   Patient presents with    Right Ankle - Follow-up     ORIF Right Ankle 3/11/24     History of present illness    58-year-old female presented clinic today for her 3-month postop follow-up visit status post ORIF right ankle.  She is doing much better at this time.  No pain reported.  She is getting tired of the brace because this is causing more anterior distal right shin pain when she wears it.  She is been weightbearing as tolerated on the walker at home which seems to be going well.  She has been continuing with outpatient physical therapy.  Mild numbness over the anterior lateral aspect of the right ankle.  Very eager to get into her brace and start walking with her shoes.      Past medical , Surgical, Family and social history reviewed.      Physical exam  General: No acute distress and breathing comfortably.  Patient is pleasant and cooperative with the examination.    Extremity  Right ankle is neurovascular intact.  She demonstrates good range of motion with dorsiflexion plantarflexion inversion eversion circumduction without pain.  She has mild weakness with inversion to resistance as well as inversion to resistance.  No bony tenderness.  Mild edema over the posterior lateral region of the right heel.  Minimal tenderness over the anterior tibialis tendon.  No deformity.  No instability on exam.  No sign of infection.  Incision sites well-healed without abnormality.    Diagnostics  Please see dictated x-ray report  No results found.     Procedure  [ none]    Assessment  Status post ORIF right ankle    Treatment plan  1.  At this time we discussed updated weightbearing status she will continue to be weightbearing as tolerated in the ankle brace.  2.  She will continue with outpatient physical therapy transitioning to home exercise program with her bands.  3.  She was given instructions on the ankle brace today.  She will start weaning  herself out of the brace in the next 2 weeks follow-up with us if she needs anything in the future.  For complete plan and/or surgical details, please refer to Dr. Lechuga's portion of the split/shared dictation.  4.  All of the patient's questions were answered.    Orders Placed This Encounter    XR ankle right 3+ views       This note was prepared using voice recognition software.  The details of this note are correct and have been reviewed, and corrected to the best of my ability.  Some grammatical areas may persist related to the Dragon software    Catrachito Pineda PA-C, [facility]  Orthopedic [place]    [phone]    In a face-to-face encounter performed today, I Matteo Lechuga MD performed a history and physical examination, discussed pertinent diagnostic studies if indicated, and discussed diagnosis and management strategies with both the patient and the midlevel provider.  I reviewed the midlevel's note and agree with the documented findings and plan of care.  Greater than 50% of the evaluation and treatment decision was performed by the physician myself during today's visit.    58-year-old female little over 3 months out from her ORIF right ankle states she doing better and better she still has some weakness in her ankle but not really have any pain only minimal swelling at this time.  She been transitioning from her boot to her brace she has been weightbearing as tolerated.  On examination her wounds are well-healed minimal swelling neurologically intact no signs of infection.  X-rays are reviewed today show healing fracture in good alignment.  Plan is lace up ankle brace weight-bear as tolerated continue with her exercise program follow-up if she has increased pain or discomfort otherwise..      Patient has severe impairment related to her presenting condition.  It is significantly impairing bodily function.  We did discuss surgical and nonsurgical options.    This note was  prepared using voice recognition software.  The details of this note are correct and have been reviewed, and corrected to the best of my ability.  Some grammatical areas may persist related to the Dragon software    Matteo Lechuga MD  Senior Attending Physician  [facility]    [phone]

## 2024-07-17 ENCOUNTER — EVALUATION (OUTPATIENT)
Dept: PHYSICAL THERAPY | Facility: CLINIC | Age: 59
End: 2024-07-17
Payer: MEDICARE

## 2024-07-17 DIAGNOSIS — S82.851D TRIMALLEOLAR FRACTURE OF ANKLE, CLOSED, RIGHT, WITH ROUTINE HEALING, SUBSEQUENT ENCOUNTER: ICD-10-CM

## 2024-07-17 PROCEDURE — 97110 THERAPEUTIC EXERCISES: CPT | Mod: GP

## 2024-07-17 PROCEDURE — 97161 PT EVAL LOW COMPLEX 20 MIN: CPT | Mod: GP

## 2024-07-17 ASSESSMENT — ENCOUNTER SYMPTOMS
OCCASIONAL FEELINGS OF UNSTEADINESS: 0
DEPRESSION: 0
LOSS OF SENSATION IN FEET: 0

## 2024-07-17 ASSESSMENT — PATIENT HEALTH QUESTIONNAIRE - PHQ9
SUM OF ALL RESPONSES TO PHQ9 QUESTIONS 1 AND 2: 2
10. IF YOU CHECKED OFF ANY PROBLEMS, HOW DIFFICULT HAVE THESE PROBLEMS MADE IT FOR YOU TO DO YOUR WORK, TAKE CARE OF THINGS AT HOME, OR GET ALONG WITH OTHER PEOPLE: SOMEWHAT DIFFICULT
2. FEELING DOWN, DEPRESSED OR HOPELESS: SEVERAL DAYS
1. LITTLE INTEREST OR PLEASURE IN DOING THINGS: SEVERAL DAYS

## 2024-07-17 NOTE — PROGRESS NOTES
Physical Therapy Evaluation    Patient Name: Gita Kincaid  MRN: [id number]  PT Evaluation Time Entry  PT Evaluation (Low) Time Entry: 25  PT Therapeutic Procedures Time Entry  Therapeutic Exercise Time Entry: 11                 Current Problem  1. Trimalleolar fracture of ankle, closed, right, with routine healing, subsequent encounter  Referral to Physical Therapy        Insurance    Insurance reviewed   Visit number: 1  [employer] COP[employer] 0 DED 0 COVERAGE 100 OOP 0 AUTH REQ AFTER IE       Subjective   General:  Pt comes in today with R ankle pain following R trimalleolar fx s/p ORIF following a fall on 3/11/24 after slipping the mud. She states she had HHPT 1x/week for about 2 months. She states with HHPT she was doing a lot of ROM exercises. She states she was NWB for about 6 weeks and then progressed to a walking boot. She reports she was using a RW for about 3 weeks and then progressed to SPC. She rates her pain 3/10 and can get up to 6/10. She reports she is taking tylenol several times per day but only helps a little. She does elevate it at the end of the day which feels good. She reports swelling has gone down. She tried a compression sock which did not help. She reports the hardest thing for her to do would be walking. Hx OA, migraines  Occupation: Disability, retired  PLOF: Independent with all activity  Goal for Therapy: Walk right  Home Environment: Trailer, 3 [address], Shower with bench, lives alone    Precautions:  WBAT  Pain:  REDUCES SYMPTOMS: Tylenol    PROVOKES SYMPTOMS: Walking stairs    Red Flags: Do you have any of the following? N over the incision  Fever/chills, unexplained weight changes, dizziness/fainting, unexplained change in bowel or bladder functions, unexplained malaise or muscle weakness, , numbness or tingling  Does report night sweats    Reviewed medical screening form with pt and medical screening assessed    Imaging:   FINDINGS: Xray 6/19/24  Right ankle three views. Status post  open reduction internal fixation  by malleolar ankle fracture mortise is intact hardware in good  position fracture well aligned without displacement no other bony  abnormality  Objective          PALPATION: TTP anterior talocrual joint            BALANCE: dec            GAIT: Antalgic, dec DF in swing on R, dec PF in toe off on R, toe out on R, use of SPC           AROM  Right DF: lacking 10 degrees      Left DF: 0-10    Right PF:  0-7    Left PF: 0-25    Right inversion: 0-20      Left inversion: 0-40    Right eversion:  0-10    Left eversion: 0-30               MMT  Right tibialis anterior:  4-    Left tibialis anterior: 5    Right EHL/ED: 4-     Left EHL/ED: 5    Right tibialis posterior: 3+      Left tibialis posterior: 4+    Right peroneals: 4-      Left peroneals: 5    Right gastrocnemius: 3+     Left gastrocnemius: 4+      MMT R hip and knee WNL            Flexibility  Right gastrocnemius: limited     Left gastrocnemius: WNL    Right soleus: limited     Left soleus: WNL              Special Tests    Lateral Talar Tilt Stress Test: Right -  Left -    Medial Talar Tilt Stress Test: Right -  Left -    Squeeze Test: Right -  Left -    Henry's Test: Right -  Left -    Outcome Measures:  Other Measures  Lower Extremity Funtional Score (LEFS): 33 (/80)     Treatment: See HEP below    EDUCATION/HEP:  Access Code: [email or web address]  URL: [email or web address]  Date: 07/17/2024  Prepared by: Ashley Gilliam    Exercises  - Seated Heel Slide  - 1 x daily - 7 x weekly - 1-2 sets - 10 reps  - Ankle Inversion Eversion Towel Slide  - 1 x daily - 7 x weekly - 2 sets - 10 reps  - Towel Scrunches  - 1 x daily - 7 x weekly - 2 sets - 10 reps  - Seated Heel Toe Raises  - 1 x daily - 7 x weekly - 1-2 sets - 10 reps  - Seated Heel Raise  - 1 x daily - 7 x weekly - 1-2 sets - 10 reps  Assessment:  57 y/o pt who presents with R ankle pain, dec strength, dec ROM, dec flexibility, dec functional  mobility, and abnormal gait mechanics secondary to trimalleolar fx s/p ORIF which occurred on 3/11/24 by Dr. Lechuga. Functional limitations include standing, walking, stairs, and self care tasks. Pt will benefit from skilled therapy in order to improve pain, strength, ROM, flexibility, functional mobility, and gait mechanics.    Clinical Presentation: Stable     Level of Complexity: Low   Goals:  1. I w/advanced HEP  2. Pt will demonstrate improved strength of RLE to 4+/5 in order to return to normal ADLs  3. Pt will demonstrate increase in gross ankle ROM in order to improve gait mechanics  4. R LE SLS >/=30 sec no UE assist on compliant surface without UE assist for functional carryover into dynamic balance  5. amb community distance nil deficits over varying surfaces/inclines independent without AD or increased pain R LE     Plan  Treatment/Interventions: Cryotherapy, Education/ Instruction, Gait training, Manual therapy, Neuromuscular re-education, Self care/ home management, Taping techniques, Therapeutic activities, Therapeutic exercises  PT Plan: Skilled PT  PT Frequency: 1 time per week  Duration: 6 weeks  Onset Date: 03/11/24  Certification Period Start Date: 07/17/24  Certification Period End Date: 10/15/24  Number of Treatments Authorized: BOA  Rehab Potential: Good  Plan of Care Agreement: Patient  Therapist recommended 2x/week but pt states she can only do 1x/week d/t transportation and other things going on

## 2024-07-24 ENCOUNTER — APPOINTMENT (OUTPATIENT)
Dept: ORTHOPEDIC SURGERY | Facility: CLINIC | Age: 59
End: 2024-07-24
Payer: MEDICARE

## 2024-07-31 ENCOUNTER — TREATMENT (OUTPATIENT)
Dept: PHYSICAL THERAPY | Facility: CLINIC | Age: 59
End: 2024-07-31
Payer: MEDICARE

## 2024-07-31 DIAGNOSIS — S82.851D TRIMALLEOLAR FRACTURE OF ANKLE, CLOSED, RIGHT, WITH ROUTINE HEALING, SUBSEQUENT ENCOUNTER: Primary | ICD-10-CM

## 2024-07-31 PROCEDURE — 97140 MANUAL THERAPY 1/> REGIONS: CPT | Mod: GP,CQ

## 2024-07-31 PROCEDURE — 97110 THERAPEUTIC EXERCISES: CPT | Mod: GP,CQ

## 2024-07-31 ASSESSMENT — PAIN SCALES - GENERAL: PAINLEVEL_OUTOF10: 1

## 2024-07-31 ASSESSMENT — PAIN - FUNCTIONAL ASSESSMENT: PAIN_FUNCTIONAL_ASSESSMENT: 0-10

## 2024-07-31 NOTE — PROGRESS NOTES
"Physical Therapy Treatment    Patient Name: Gita Kincaid  MRN: [id number]  Today's Date: 7/31/2024  Time Calculation  Start Time: 1000  Stop Time: 1043  Time Calculation (min): 43 min  PT Therapeutic Procedures Time Entry  Manual Therapy Time Entry: 10  Therapeutic Exercise Time Entry: 30       Current Problem  1. Trimalleolar fracture of ankle, closed, right, with routine healing, subsequent encounter            Subjective   General   Pt stating some persistent soreness R ankle. Endorses some continued numbness top of foot and still with some swelling throughout. At this point, more R knee pain than anything else.  [facility]  Visit 2  Approved 6  Date Range 7/18/24 - 9/6/24  Precautions  Precautions  LE Weight Bearing Status: Weight Bearing as Tolerated  Pain  Pain Assessment: 0-10  0-10 (Numeric) Pain Score: 1  Pain Type: Chronic pain  Pain Location: Ankle  Pain Orientation: Right    Objective   Treatments:  HR/TR, 20 reps  Ankle circles, 20 reps  Alphabet, 1 round  Towel scrunches, 20 reps  Towel wipers, 20 reps  Arch builders, 20 reps, 3 sec hold  Ankle 4-way, RTB, 10 reps x2    Gastroc and soleus stretches with strap, 30 sec hold, 3 reps each  Great toe extension stretch, 30 sec hold, 3 reps    Partial squats, 20 reps  HR/TR, 1/2 roll, 20 reps  SLS on black foam pad, 10 sec hold, 5   4\" forward and lateral step ups, 20 reps    Grade 1-2 Talor mobs, PROM R ankle and great toe - 10 minutes    Assessment   Pt ambulating with antalgic gait and SPC. Tolerated treatment well today. Denies pain R ankle (some pain step ups R knee) with normal muscle fatigue noted. Continued ex's to address noted LE weakness and ankle mobility deficits. Guarded during manual secondary to apprehension but without pain end ranges. Showing improved overall mobility to this point. Updated HEP and reviewed. Pt still with DF and inversion/eversion mobility deficits as well as generalized strength and balance deficits requiring " therapy to address.  Plan:  Progress with POC as tolerated focus on progress ROM, ankle stability.    OP EDUCATION:       Goals:

## 2024-08-06 ENCOUNTER — APPOINTMENT (OUTPATIENT)
Dept: PHYSICAL THERAPY | Facility: CLINIC | Age: 59
End: 2024-08-06
Payer: MEDICAID

## 2024-08-13 ENCOUNTER — APPOINTMENT (OUTPATIENT)
Dept: PHYSICAL THERAPY | Facility: CLINIC | Age: 59
End: 2024-08-13
Payer: MEDICAID

## 2024-08-14 ENCOUNTER — APPOINTMENT (OUTPATIENT)
Dept: PHYSICAL THERAPY | Facility: CLINIC | Age: 59
End: 2024-08-14
Payer: MEDICAID

## (undated) DEVICE — BATH BLANKET STERILE

## (undated) DEVICE — GOWN, SURGICAL, ROYAL SILK, XL, STERILE

## (undated) DEVICE — STAPLER, SKIN, PLUS, WIDE, 35

## (undated) DEVICE — PADDING, CAST, SPECIALIST, 6 IN X 4 YD, STERILE

## (undated) DEVICE — DRESSING, GAUZE, SPONGE, 12 PLY, 4 X 4 IN, PLASTIC POUCH, STRL 10PK

## (undated) DEVICE — DRESSING, ABDOMINAL PAD, CURITY, 7.5 X 8 IN

## (undated) DEVICE — SUTURE, MONOCRYL, 3-0, 36 IN, CT-1, UNDYED

## (undated) DEVICE — CUFF, TOURNIQUET, DUAL PORT, SNG BLADDER, 42 IN, PLC

## (undated) DEVICE — STRAP, ARM BOARD, 32 X 1.5

## (undated) DEVICE — STOCKINETTE, IMPERVIOUS, LARGE, 9IN X 48IN

## (undated) DEVICE — GUIDEWIRE, THREADED, SPADE POINT, ONE END, 1.25 X 150 MM, STAINLESS STEEL

## (undated) DEVICE — STRAP, VELCRO, BODY, 4 X 60IN, NS

## (undated) DEVICE — BANDAGE, ESMARK, 6 IN X 9 FT, STERILE

## (undated) DEVICE — Device

## (undated) DEVICE — DRAPE, SHEET, U, W/ADHESIVE STRIP, IMPERVIOUS, 60 X 70 IN, DISPOSABLE, LF, STERILE

## (undated) DEVICE — MANIFOLD, 4 PORT NEPTUNE STANDARD

## (undated) DEVICE — DRAPE, C-ARM IMAGE

## (undated) DEVICE — DRILL BIT, 2.0MM, QC, 140MM, W/DEPTH MARK

## (undated) DEVICE — BANDAGE, COFLEX, 6 X 5 YDS, FOAM TAN, STERILE, LF

## (undated) DEVICE — GOWN, SURGICAL, ROYAL SILK, LG, STERILE

## (undated) DEVICE — DRAPE, SHEET, U, STERI DRAPE, 47 X 51 IN, DISPOSABLE, STERILE

## (undated) DEVICE — PREP, IODOPHOR, W/ALCOHOL, DURAPREP, W/APPLICATOR, 26 CC

## (undated) DEVICE — GLOVE, SURGICAL, PROTEXIS PI , 7.5, PF, LF

## (undated) DEVICE — BIT, DRILL, QUICK-COUPLING, 2.5 X 110 MM, STAINLESS STEEL, GOLD

## (undated) DEVICE — SPLINT, SAFETY, PRE-CUT, 5 X 30 IN

## (undated) DEVICE — DRESSING, NON-ADHERENT, OIL EMULSION, CURITY, 3 X 8 IN, STERILE

## (undated) DEVICE — TOWELS 4-PK

## (undated) DEVICE — DRAPE, SHEET, EXTREMITY, W/ARM BOARD COVERS, 87 X 106 X 128 IN, DISPOSABLE, LF, STERILE